# Patient Record
Sex: FEMALE | Race: WHITE | NOT HISPANIC OR LATINO | Employment: OTHER | URBAN - METROPOLITAN AREA
[De-identification: names, ages, dates, MRNs, and addresses within clinical notes are randomized per-mention and may not be internally consistent; named-entity substitution may affect disease eponyms.]

---

## 2023-10-26 ENCOUNTER — TELEMEDICINE (OUTPATIENT)
Dept: BEHAVIORAL HEALTH | Facility: CLINIC | Age: 58
End: 2023-10-26
Payer: MEDICARE

## 2023-10-26 DIAGNOSIS — F79 INTELLECTUAL DISABILITY: ICD-10-CM

## 2023-10-26 DIAGNOSIS — F41.1 GENERALIZED ANXIETY DISORDER: ICD-10-CM

## 2023-10-26 DIAGNOSIS — Z73.89 OTHER PROBLEMS RELATED TO LIFE MANAGEMENT DIFFICULTY: ICD-10-CM

## 2023-10-26 DIAGNOSIS — Z63.79 OTHER STRESSFUL LIFE EVENTS AFFECTING FAMILY AND HOUSEHOLD: ICD-10-CM

## 2023-10-26 DIAGNOSIS — Z86.59 HISTORY OF DEPRESSION: ICD-10-CM

## 2023-10-26 DIAGNOSIS — F60.9 PERSONALITY DISORDER, UNSPECIFIED (MULTI): ICD-10-CM

## 2023-10-26 PROCEDURE — 99214 OFFICE O/P EST MOD 30 MIN: CPT | Performed by: STUDENT IN AN ORGANIZED HEALTH CARE EDUCATION/TRAINING PROGRAM

## 2023-10-26 NOTE — PROGRESS NOTES
"PRESENT FOR VISIT  -Patient  -Caregiver    #LAST INTERVENTION   No medications changes. Given reports of sleep disturbance, patient was counselled on sleep hygiene and to track sleep. Discussed the need to set boundaries in the new relationship and counselled on safe sex.       HISTORY OF PRESENT ILLNESS   Interval change  Has not been meeting up with her boyfriend in the past 3 weeks. Reportedly had no argument with him. Staff noted that patient might have been \"ghosted\". No new issues/concerns.      #Mood/Irritability   Stable. Patient expresses feelings of sadness and anger since her boyfriend started ignoring him. However, staff reports that patient has been keeping busy with other activities like candle making. Patient will be going for [employer] dance on Friday and bowling on Tuesday.  No report of significant changes in mood, crying spells, frequent mood swings, or significant irritability.   No report of concern for depression from staff.  Does not endorse cardinal signs/symptoms of major depressive disorder.       #Behavior   Stable. No reports of impulsive shopping and is spending within her budget. No report of physical aggression, significant property destruction, elopement, or self-injurious behavior.   Recall from prior: Report of issues with anger or impulse control. The staff reports that she gets angry when they try to restrict her shopping. There is a report of obsession over people and manipulation.      #Sleep  Stable. No reports of sleep changes.  Recall from prior: Reports difficulty falling asleep about once a month.      #Anxiety   Stable  No report of excessive worrying, perseverating, or reassurance-seeking behavior.   No report of significant restlessness, pacing, or other signs suggesting psychomotor agitation.   No report of signs/symptoms consistent with separation anxiety or panic attacks.        #Medication   Stable  Endorses medication adherence  No report of medication side " effects  No significant change in frequency of use of PRNs for behaviors/agitation  No significant change in effectiveness of PRNs for mitigating target behavior        #Health   Stable  No report of hospitalizations or ED visits since last visit.  No report of significant change in health status, overall functioning, or non-psych medication  No report of significant change in appetite        REVIEW OF SYMPTOMS  -Depression/Mood: negative  -Anxiety: negative  -Psychosis: negative  -Valentina: negative  -ADHD: negative  -ODD: negative  -OCD: negative  -Sexually inappropriate behavior: none reported  -Sleep: No issues reported. No changes from baseline.    -Appetite: No issues reported. No report of pica. No changes from baseline  -Medical ROS: no report of difficulty urinating, seizures, rash, polydipsia, or constipation beyond baseline.  *All other systems have been reviewed and are negative for complaint unless otherwise noted above       PSYCHIATRIC/BEHAVIORAL HISTORY  -Prior diagnoses: depression, anxiety     -Was previously seeing psychiatrist at [facility] (since ~2014)  -Therapist: sees a counselor at [facility], Esther Chi  -Past psych hospitalizations: 2014 and 2023 for SI/HI but per staff report was thought to be misunderstanding  -No reported history of violence  -No reported history of self-injurious behavior  -No reported history of suicide attempt  -Previous psych meds: Escitalopram  -Current psych meds:   --Buspar 15 mg qam, 15mg at noon and 10 mg at 7 pm  --Lamotrigine 150 mg at 7 pm  --Abilify 2.5 mg once daily at 7 pm      SOCIAL HISTORY  -Lives alone in an apartment; does not like to live with others  -Family: minimal involvement (very few family members left)  -Interests/hobbies: shopping, eating out ([employer]), hanging out with friend Patricia  -Care needs: staff only in the daytime, 4 to 5 hours a day, iADLs, some assistance with household chores reportedly because patient not  wanting to rather than being unable to  -Work/School: attends day program/workshop  -Guardianship: guardian Gloria Alvarado   -Language/Communication: Good verbal fluency. Spontaneous speech. Good pragmatic language abilities.  -Cognitive abilities: Unknown history of formal testing. Able to read/write about middle school, fair money management abilities.    -Does not endorse ETOH or illicit drug use. No reported history of past substance abuse.  -Former smoker/occasional smoker  -No reported history of significant trauma   -No reported access to firearms         PAST MEDICAL HISTORY  -Hypertension  -Hypercholesterolemia  -Diabetes Mellitus  -Polyps and diverticulosis. Patient undergoes colonoscopy annually.   -PCP: Maxwell Patel  -Podiatrist: Pamela Aranda  -Optometrist: Rony Hidalgo  -Dentist: [facility]  -ObGyn: [facility]      ALLERGIES  -Penicillins  -Seasonal allergies        CURRENT MEDICATIONS  -Info caregiver and pharmacdy  -Current psych meds:   --Buspar 15 mg qam, 15mg at noon and 10 mg at 7 pm  --Lamotrigine 150 mg at 7 pm  --Abilify 2.5 mg once daily at 7 pm      PHARMACY  -[facility]      FAMILY HISTORY  -No reported family history of suicide  -No reported family history of intellectual disability or autism spectrum disorder  -No reported family history of early sudden cardiac arrest        Risk Assessment:  Patient felt to be at low acute and imminent risk of harm to self and others based on consideration of their risk and protective factors, as well as evaluation of their current clinical condition. Patient does not currently meet criteria for inpatient psychiatric hospitalization given that they do not exhibit evidence of clinically significant deterioration in baseline psychiatric illness, aggression, self-injury, or ability to care for themselves. There is no evidence that patient's current caregivers/living environment are unable to safely manage patient's behavior.  Outpatient management is currently felt to be appropriate and in the best interest of the patient. Overall risk mitigated by psychiatric follow-up care, use of psychotropic medication, guardianship, and [facility] Board services. Have engaged patient/caregivers in safety planning. They are aware of emergency psychiatric resources available in the event of an acute psychiatric emergency, such as Mobile Crisis, 911, and local ER.       Mental Status Exam:     Appearance: adequate grooming   Build: average  Demeanor: average   Eye Contact: average   Motor Activity: Average, no PMA/PMR.  Musculoskeletal: No TD, tics, or tremor appreciated. AIMS score 0.   Mood: euthymic   Affect: full  Speech: Regular rate, rhythm, volume and tone. Good verbal fluency. Spontaneous speech. Good pragmatic language abilities.  Thought Process: [place]. Generally goal directed. Associations are logical.  Thought Content: Does not endorse suicidal or homicidal ideation, no delusions elicited.   Thought Perception: Does not endorse auditory or visual hallucinations. Does not appear to be responding to hallucinatory stimuli  Orientation: alert, oriented x 3  Attention/Concentration: normal  Cognition: absence of significant cognitive impairment; but possible borderline intellectual functioning  Insight: limited  Judgement: adequate      IMPRESSION  Patient appears to be psychiatrically and behaviorally stable. Other than feeling hurt because of recent issues with her boyfriend, patient is reportedly doing well. No reports of impulsive shopping. Tolerating medication and endorsing adherence. No new issues/concerns reported. Will continue current treatment plan and make no medication changes today.       Diagnoses:  -Generalized anxiety disorder  -Major depressive disorder  -Mild intellectual disability  -Personality disorder, other specified        PLAN / MANAGEMENT / RECOMMENDATIONS               #Visit Complexity  -Visit of high complexity given  patient's intellectual/developmental disability and/or impaired communication abilities resulting in need for the presence of a third party to provide clinical information and history.    #Medication management   -Continue:   --Buspar 10 mg, 1.5 tablets twice daily, am and pm  --Lamotrigine 150 mg at bedtime  --Abilify 5 mg once daily      #Medication Considerations  -Medication consent/assent:   Risks, benefits, alternatives, off-label uses, black box warnings, and frequent/important side effects of medications have been discussed with patient/caregiver. To the extent possible, they have voiced understanding and agreement with recommended use of psychotropic medication.     -Medical necessity for continued treatment with psychotropic medication:      [] Symptom reduction        [] Improvement in functioning      [] Reduce risk of harm to self/others      [x] Maintenance therapy to prevent deterioration in functioning      -Rational for not reducing medication dose at this time:           [x] Significant risk of deterioration in functioning       [] Concern for elevating risk of harm to self/others      [] Prior dose reduction unsuccessful and/or harmful      [x] Psychiatric/behavioral condition not adequately stabilized/optimized       [] Medication regimen recently modified          -OARRS  --I have personally reviewed patient's OARRS report        -Risk/Benefit assessment:  There is no report of signs/symptoms consistent with medication-induced impairment in daily functioning. At this time, benefits of medication felt to outweigh potential risks. But we will continue to reassess need for psychotropic medication at regular 3 to 6 month intervals, or sooner as clinically indicated.      #Monitoring plan  -Labs reportedly done 8/22/23  --Labs to monitor: CBC, CMP, TSH/T4, lipid panel, Hgb A1c, EKG      #MDM/Complexity Issues    [] Chronic medical comorbidities     [] Chronic risk of harm to self/others     [x]  Intellectual/Developmental disability     [x] Need for independent historian due to intellectual/developmental disability              and/or   impaired communication abilities     [] Controlled substance medication requiring regular monitoring     [] Medication requiring significant ongoing monitoring for toxicity       #Counseling Provided     [x] Diagnostic impression/prognosis      [x] Risks and benefits of treatment options     [x] Instruction for management/treatment and follow-up     [x] Educated patient/caregiver about:  safety planning, communication                  #Coordination of care provided    [] Family    [x] Caregiver/DSP/Staff       []Agency supervisor       [] Nursing staff      [] SSA/          []Therapist                     []Guardian       #Follow-up   -in about 1 month, or sooner if new/worsening symptoms           Time:  Prep time on date of the patient encounter: 10 minutes  Time spent directly with patient/family/caregiver: 40 minutes  Additional time spent on patient care activities: 10 minutes   Documentation time: 10 minutes  Total time on date of patient encounter: 70 minutes        Scribe Attestation  By signing my name below, I, Barbiparadise Ricks , Scribbabita   attest that this documentation has been prepared under the direction and in the presence of Gloria Dexter DO.

## 2023-11-30 ENCOUNTER — TELEMEDICINE (OUTPATIENT)
Dept: BEHAVIORAL HEALTH | Facility: CLINIC | Age: 58
End: 2023-11-30
Payer: MEDICARE

## 2023-11-30 DIAGNOSIS — F60.9 PERSONALITY DISORDER, UNSPECIFIED (MULTI): ICD-10-CM

## 2023-11-30 DIAGNOSIS — F41.1 GENERALIZED ANXIETY DISORDER: ICD-10-CM

## 2023-11-30 DIAGNOSIS — F79 INTELLECTUAL DISABILITY: ICD-10-CM

## 2023-11-30 DIAGNOSIS — Z86.59 HISTORY OF DEPRESSION: ICD-10-CM

## 2023-11-30 DIAGNOSIS — F32.9 MAJOR DEPRESSIVE DISORDER, REMISSION STATUS UNSPECIFIED, UNSPECIFIED WHETHER RECURRENT: ICD-10-CM

## 2023-11-30 DIAGNOSIS — Z73.89 OTHER PROBLEMS RELATED TO LIFE MANAGEMENT DIFFICULTY: ICD-10-CM

## 2023-11-30 DIAGNOSIS — Z63.79 OTHER STRESSFUL LIFE EVENTS AFFECTING FAMILY AND HOUSEHOLD: ICD-10-CM

## 2023-11-30 PROCEDURE — 99215 OFFICE O/P EST HI 40 MIN: CPT | Performed by: STUDENT IN AN ORGANIZED HEALTH CARE EDUCATION/TRAINING PROGRAM

## 2023-11-30 RX ORDER — LAMOTRIGINE 150 MG/1
1 TABLET ORAL NIGHTLY
COMMUNITY
Start: 2018-02-09 | End: 2023-12-12 | Stop reason: SDUPTHER

## 2023-11-30 RX ORDER — ARIPIPRAZOLE 5 MG/1
5 TABLET ORAL
COMMUNITY
Start: 2023-02-18 | End: 2023-12-12 | Stop reason: SDUPTHER

## 2023-11-30 RX ORDER — BUSPIRONE HYDROCHLORIDE 10 MG/1
15 TABLET ORAL 2 TIMES DAILY
COMMUNITY
Start: 2022-02-10 | End: 2023-12-12 | Stop reason: SDUPTHER

## 2023-11-30 NOTE — PATIENT INSTRUCTIONS
AFTER VISIT SUMMARY      Date: 11/30/2023  Appointment with Psychiatrist - Dr. Dexter      Reason for Visit:  -Routine follow-up/Medication management      Discussed during Appointment:   -Interval changes since last visit  -Medication      Clinical Impression/Status Update:  -Patient appears to be psychiatrically and behaviorally stable  -No report of new issues/concerns    Reported clinical stability suggests current medication regimen providing adequate symptom control while also being well tolerated by patient. Thus will continue/refill current meds for now.     -Risk/Benefit assessment:   Medical necessity for continued treatment with psychotropic medication:   There is no report of signs/symptoms consistent with medication-induced impairment in daily functioning. At this time, benefits of medication felt to outweigh potential risks. But we will continue to reassess need for psychotropic medication at regular 3 to 6 month intervals, or sooner as clinically indicated.      #Clinic Policies/Procedures  -Refills  Prescriptions will be sent to your pharmacy with enough refills to last until your next appointment. For established patients, we typically provide 6 refills for regular meds and 3 refills for controlled substances (e.g., ADHD stimulant medication, benzodiazepines such as lorazepam). We will not provide additional refills beyond that without having an appointment. You can schedule an appointment by calling our office at [phone].     -Paperwork Requests (e.g., [employer] for guardianship)  We ask that you please schedule an appointment if needing paperwork filled out by the doctor (e.g., [employer], FMLA form).  Please provide as much information as possible about your request and email the doctor any forms needing to be filled out prior to the appointment.       ===========================  INSTRUCTIONS/RECOMMENDATIONS  ===========================    #Medication   -No medication changes made  today  --Continue taking your psych medications the same as usual    --Refills will be sent to your pharmacy      #Follow-up  -We would like to see you again in about 6 weeks  --> Please call the office ([phone]) to schedule an appointment at your earliest convenience.    *If having new/worsening symptoms, please call the clinic ([phone]) to discuss being seen sooner

## 2023-11-30 NOTE — PROGRESS NOTES
PRESENT FOR VISIT  -Patient  -Caregiver    #LAST INTERVENTION   Last seen about 1 month ago. No medications changes. Other than feeling hurt because of issues with her boyfriend, patient was reportedly doing well. No reports of impulsive shopping.      HISTORY OF PRESENT ILLNESS   Interval change  -Patient reported to be at about their psychiatric/behavioral baseline  -No report of new issues/concerns    -Patient has been in touch with her boyfriend lately over the phone. She talks and texts with him occasionally. Patient reports that she has been supporting him emotionally since the passing of his mom. Patient goes out into the community where she watches movies and "[employer]ke and acknowledges that she has been enjoying herself. Patient is traveling on December 8 for her vacation in [place]. She'll be spending about 3 days away.  -No report of impulsive shopping.       #Mood/Irritability   Stable.  No report of significant changes in mood, crying spells, frequent mood swings, or significant irritability.   No report of concern for depression from staff.  Does not endorse cardinal signs/symptoms of major depressive disorder.   Recall from prior: Feelings of sadness and anger since her boyfriend started ignoring her. However, staff reports that patient has been keeping busy with other activities like candle making.       #Behavior   Stable. No reports of impulsive shopping. Patient reports not knowing what her weekly budget is but has someone that she discusses it with.  No report of physical aggression, significant property destruction, elopement, or self-injurious behavior.   Recall from prior: Report of issues with anger or impulse control. The staff reports that she gets angry when they try to restrict her shopping. There is a report of obsession over people and manipulation.      #Sleep  Stable. No reports of sleep changes.  Recall from prior: Reports difficulty falling asleep about once a month.      #Anxiety    Stable  No report of excessive worrying, perseverating, or reassurance-seeking behavior.   No report of significant restlessness, pacing, or other signs suggesting psychomotor agitation.   No report of signs/symptoms consistent with separation anxiety or panic attacks.        #Medication   Stable  Endorses medication adherence  No report of medication side effects  No significant change in frequency of use of PRNs for behaviors/agitation  No significant change in effectiveness of PRNs for mitigating target behavior        #Health   Stable. Getting her Flu shot and COVID booster shot soon.  No report of hospitalizations or ED visits since last visit.  No report of significant change in health status, overall functioning, or non-psych medication  No report of significant change in appetite      REVIEW OF SYMPTOMS  -Depression/Mood: negative  -Anxiety: negative  -Psychosis: negative  -Valentina: negative  -ADHD: negative  -ODD: negative  -OCD: negative  -Sexually inappropriate behavior: none reported  -Sleep: No issues reported. No changes from baseline.    -Appetite: No issues reported. No report of pica. No changes from baseline  -Medical ROS: no report of difficulty urinating, seizures, rash, polydipsia, or constipation beyond baseline.  *All other systems have been reviewed and are negative for complaint unless otherwise noted above     PSYCHIATRIC/BEHAVIORAL HISTORY  -Prior diagnoses: depression, anxiety     -Was previously seeing psychiatrist at [facility] (since ~2014)  -Therapist: sees a counselor at [facility], Esther Chi  -Past psych hospitalizations: 2014 and 2023 for SI/HI but per staff report was thought to be misunderstanding  -No reported history of violence  -No reported history of self-injurious behavior  -No reported history of suicide attempt  -Previous psych meds: Escitalopram  -Current psych meds:   --Buspar 15 mg qam, 15mg at noon and 10 mg at 7 pm  --Lamotrigine 150 mg at 7  pm  --Abilify 2.5 mg once daily at 7 pm        SOCIAL HISTORY  -Lives alone in an apartment; does not like to live with others  -Family: minimal involvement (very few family members left)  -Interests/hobbies: shopping, eating out ([employer]), hanging out with friend Patricia  -Care needs: staff only in the daytime, 4 to 5 hours a day, iADLs, some assistance with household chores reportedly because patient not wanting to rather than being unable to  -Work/School: attends day program/workshop  -Guardianship: guardian Gloria Alvarado   -Language/Communication: Good verbal fluency. Spontaneous speech. Good pragmatic language abilities.  -Cognitive abilities: Unknown history of formal testing. Able to read/write about middle school, fair money management abilities.    -Does not endorse ETOH or illicit drug use. No reported history of past substance abuse.  -Former smoker/occasional smoker  -No reported history of significant trauma   -No reported access to firearms            PAST MEDICAL HISTORY  -Hypertension  -Hypercholesterolemia  -Diabetes Mellitus  -Polyps and diverticulosis. Patient undergoes colonoscopy annually.   -PCP: Maxwell Patel  -Podiatrist: Pamela Aranda  -Optometrist: Rony Hidalgo  -Dentist: [facility]  -ObGyn: [facility]        ALLERGIES  -Penicillins  -Seasonal allergies           CURRENT MEDICATIONS  -Info caregiver and pharmacdy  -Current psych meds:   --Buspar 15 mg qam, 15mg at noon and 10 mg at 7 pm  --Lamotrigine 150 mg at 7 pm  --Abilify 2.5 mg once daily at 7 pm        PHARMACY  -[facility]        FAMILY HISTORY  -No reported family history of suicide  -No reported family history of intellectual disability or autism spectrum disorder  -No reported family history of early sudden cardiac arrest           Risk Assessment:  Patient felt to be at low acute and imminent risk of harm to self and others based on consideration of their risk and protective factors, as well as  evaluation of their current clinical condition. Patient does not currently meet criteria for inpatient psychiatric hospitalization given that they do not exhibit evidence of clinically significant deterioration in baseline psychiatric illness, aggression, self-injury, or ability to care for themselves. There is no evidence that patient's current caregivers/living environment are unable to safely manage patient's behavior. Outpatient management is currently felt to be appropriate and in the best interest of the patient. Overall risk mitigated by psychiatric follow-up care, use of psychotropic medication, guardianship, and [facility] Board services. Have engaged patient/caregivers in safety planning. They are aware of emergency psychiatric resources available in the event of an acute psychiatric emergency, such as [email or web address], 911, and local ER.         Mental Status Exam:     Appearance: adequate grooming   Build: average  Demeanor: average   Eye Contact: average   Motor Activity: Average, no PMA/PMR.  Musculoskeletal: No TD, tics, or tremor appreciated. AIMS score 0.   Mood: euthymic   Affect: full  Speech: Regular rate, rhythm, volume and tone. Good verbal fluency. Spontaneous speech. Good pragmatic language abilities.  Thought Process: [place]. Generally goal directed. Associations are logical.  Thought Content: Does not endorse suicidal or homicidal ideation, no delusions elicited.   Thought Perception: Does not endorse auditory or visual hallucinations. Does not appear to be responding to hallucinatory stimuli  Orientation: alert, oriented x 3  Attention/Concentration: normal  Cognition: absence of significant cognitive impairment; but possible borderline intellectual functioning  Insight: limited  Judgement: adequate        IMPRESSION  Patient appears to be psychiatrically and behaviorally stable.  Tolerating medication and endorsing adherence. No new issues/concerns reported.     Given long-term stability and  staff report of unclear indication for being on some of her psychotropics, will start a slow de-prescribing process with close monitoring to optimize early detection of emerging psychiatric concerns. Will begin by discontinuing patient's mid-day dose of Buspar.     Have encouraged caregiver to register with [facility]. Will complete expert eval for continued guardianship and email that to Guardian likely in next couple weeks. Plan for follow up in 4 to 6 weeks        Diagnoses:  -Generalized anxiety disorder  -Major depressive disorder  -Mild intellectual disability  -Personality disorder, other specified        PLAN / MANAGEMENT / RECOMMENDATIONS               #Visit Complexity  -Visit of high complexity given patient's intellectual/developmental disability and/or impaired communication abilities resulting in need for the presence of a third party to provide clinical information and history.    #Medication management   -Discontinue Buspar 15mg at noon   -Continue:   --Buspar 15 mg qam and 10 mg at 7 pm  --Lamotrigine 150 mg once daily at 7 pm  --Abilify 2.5 mg once daily at 7 pm      #Medication Considerations  -Medication consent/assent:   Risks, benefits, alternatives, off-label uses, black box warnings, and frequent/important side effects of medications have been discussed with patient/caregiver. To the extent possible, they have voiced understanding and agreement with recommended use of psychotropic medication.     -Medical necessity for continued treatment with psychotropic medication:      [] Symptom reduction        [] Improvement in functioning      [] Reduce risk of harm to self/others      [x] Maintenance therapy to prevent deterioration in functioning      -Rational for not reducing medication dose at this time:           [x] Significant risk of deterioration in functioning       [] Concern for elevating risk of harm to self/others      [] Prior dose reduction unsuccessful and/or harmful      []  Psychiatric/behavioral condition not adequately stabilized/optimized       [] Medication regimen recently modified          -OARRS  --I have personally reviewed patient's OARRS report  --OARRS last checked 11/30/23      -Risk/Benefit assessment:  There is no report of signs/symptoms consistent with medication-induced impairment in daily functioning. At this time, benefits of medication felt to outweigh potential risks. But we will continue to reassess need for psychotropic medication at regular 3 to 6 month intervals, or sooner as clinically indicated.      #Monitoring plan  -Labs reportedly done 8/22/23  --Labs to monitor: CBC, CMP, TSH/T4, lipid panel, Hgb A1c, EKG      #MDM/Complexity Issues    [] Chronic medical comorbidities     [] Chronic risk of harm to self/others     [x] Intellectual/Developmental disability     [x] Need for independent historian due to intellectual/developmental disability            and/or  impaired communication abilities     [] Controlled substance medication requiring regular monitoring     [x] Medication requiring significant ongoing monitoring for toxicity (Abilify)      #Counseling Provided     [x] Diagnostic impression/prognosis      [x] Risks and benefits of treatment options     [x] Instruction for management/treatment and follow-up     [x] Educated patient/caregiver about: safety planning                  #Coordination of care provided    [] Family    [x] Caregiver/DSP/Staff       []Agency supervisor       [] Nursing staff      [] SSA/          []Therapist                     [x]Guardian      #Follow-up        -in about 4 to 6 weeks, or sooner if new/worsening symptoms          Time  -Prep time on date of the patient encounter: 10 minutes  -Time spent directly with patient/family/caregiver: 40 minutes  -Additional time spent on patient care activities: 10 minutes  -Documentation time: 10 minutes  -Total time on date of patient encounter: 70 minutes         Scribe  Attestation  By signing my name below, I, Shruthi Thompson   attest that this documentation has been prepared under the direction and in the presence of Gloria Dexter DO.

## 2023-12-01 ENCOUNTER — TELEPHONE (OUTPATIENT)
Dept: OTHER | Age: 58
End: 2023-12-01
Payer: MEDICARE

## 2023-12-04 PROBLEM — F60.9 PERSONALITY DISORDER, UNSPECIFIED (MULTI): Status: ACTIVE | Noted: 2023-12-04

## 2023-12-04 PROBLEM — F41.1 GENERALIZED ANXIETY DISORDER: Status: ACTIVE | Noted: 2023-12-04

## 2023-12-04 PROBLEM — F79 INTELLECTUAL DISABILITY: Status: ACTIVE | Noted: 2023-12-04

## 2023-12-04 PROBLEM — Z73.89 OTHER PROBLEMS RELATED TO LIFE MANAGEMENT DIFFICULTY: Status: ACTIVE | Noted: 2023-12-04

## 2023-12-04 PROBLEM — Z63.79 OTHER STRESSFUL LIFE EVENTS AFFECTING FAMILY AND HOUSEHOLD: Status: ACTIVE | Noted: 2023-12-04

## 2023-12-04 PROBLEM — Z86.59 HISTORY OF DEPRESSION: Status: ACTIVE | Noted: 2023-12-04

## 2023-12-07 ENCOUNTER — TELEPHONE (OUTPATIENT)
Dept: OTHER | Age: 58
End: 2023-12-07
Payer: MEDICARE

## 2023-12-12 PROBLEM — F32.9 MAJOR DEPRESSIVE DISORDER: Status: ACTIVE | Noted: 2023-12-12

## 2023-12-12 RX ORDER — ARIPIPRAZOLE 5 MG/1
TABLET ORAL
Qty: 16 TABLET | Refills: 0 | OUTPATIENT
Start: 2023-12-12

## 2023-12-12 RX ORDER — LAMOTRIGINE 150 MG/1
TABLET ORAL
Qty: 30 TABLET | Refills: 6 | Status: SHIPPED | OUTPATIENT
Start: 2023-12-12

## 2023-12-12 RX ORDER — LAMOTRIGINE 150 MG/1
TABLET ORAL
Qty: 22 TABLET | Refills: 0 | OUTPATIENT
Start: 2023-12-12

## 2023-12-12 RX ORDER — BUSPIRONE HYDROCHLORIDE 10 MG/1
TABLET ORAL
Qty: 124 TABLET | Refills: 0 | OUTPATIENT
Start: 2023-12-12

## 2023-12-12 RX ORDER — CHOLECALCIFEROL (VITAMIN D3) 25 MCG
TABLET ORAL DAILY
Qty: 21 TABLET | Refills: 0 | OUTPATIENT
Start: 2023-12-12

## 2023-12-12 RX ORDER — ARIPIPRAZOLE 5 MG/1
TABLET ORAL
Qty: 15 TABLET | Refills: 6 | Status: SHIPPED | OUTPATIENT
Start: 2023-12-12 | End: 2024-02-22 | Stop reason: SDUPTHER

## 2023-12-12 RX ORDER — BUSPIRONE HYDROCHLORIDE 10 MG/1
TABLET ORAL
Qty: 75 TABLET | Refills: 6 | Status: SHIPPED | OUTPATIENT
Start: 2023-12-12

## 2024-01-11 ENCOUNTER — TELEMEDICINE (OUTPATIENT)
Dept: BEHAVIORAL HEALTH | Facility: CLINIC | Age: 59
End: 2024-01-11
Payer: MEDICARE

## 2024-01-11 DIAGNOSIS — F41.1 GENERALIZED ANXIETY DISORDER: ICD-10-CM

## 2024-01-11 DIAGNOSIS — Z86.59 HISTORY OF DEPRESSION: ICD-10-CM

## 2024-01-11 DIAGNOSIS — F60.9 PERSONALITY DISORDER, UNSPECIFIED (MULTI): ICD-10-CM

## 2024-01-11 DIAGNOSIS — F79 INTELLECTUAL DISABILITY: ICD-10-CM

## 2024-01-11 PROCEDURE — 99214 OFFICE O/P EST MOD 30 MIN: CPT | Performed by: STUDENT IN AN ORGANIZED HEALTH CARE EDUCATION/TRAINING PROGRAM

## 2024-01-11 PROCEDURE — 90833 PSYTX W PT W E/M 30 MIN: CPT | Performed by: STUDENT IN AN ORGANIZED HEALTH CARE EDUCATION/TRAINING PROGRAM

## 2024-01-11 PROCEDURE — 90785 PSYTX COMPLEX INTERACTIVE: CPT | Performed by: STUDENT IN AN ORGANIZED HEALTH CARE EDUCATION/TRAINING PROGRAM

## 2024-01-11 NOTE — PATIENT INSTRUCTIONS
AFTER VISIT SUMMARY      Date: 1/11/2024  Appointment with Psychiatrist - Dr. Dexter      Reason for Visit:  -Routine follow-up/Medication management      Discussed during Appointment:   -Recent holidays, relationship, candle making, travel plans  -Physical health, psychiatric/behavioral symptoms, daily functioning, new issues/concerns     -Treatment plan/management      Clinical Impression/Status Update:  Patient reports increase in anxiety. Maybe due to stress from recent traveling or issues with boyfriend. Could also be from the recent decrease in Buspar. Will make no medication changes today. Will continue to monitor symptoms and reassess in 4 to 6 weeks.        -Risk/Benefit assessment:   Medical necessity for continued treatment with psychotropic medication:   There is no report of signs/symptoms consistent with medication-induced impairment in daily functioning. At this time, benefits of medication felt to outweigh potential risks. But we will continue to reassess need for psychotropic medication at regular 3 to 6 month intervals, or sooner as clinically indicated.      #Clinic Policies/Procedures  -Refills  Prescriptions will be sent to your pharmacy with enough refills to last until your next appointment. For established patients, we typically provide 6 refills for regular meds and 3 refills for controlled substances (e.g., ADHD stimulant medication, benzodiazepines such as lorazepam). We will not provide additional refills beyond that without having an appointment. You can schedule an appointment by calling our office at [phone].     -Paperwork Requests (e.g., Expert Eval for guardianship)  We ask that you please schedule an appointment if needing paperwork filled out by the doctor (e.g., Expert Eval, FMLA form).  Please provide as much information as possible about your request and email the doctor any forms needing to be filled out prior to the appointment.        ===========================  INSTRUCTIONS/RECOMMENDATIONS  ===========================    #Medication   -No medication changes made today  --Continue taking your psych medications the same as usual    --Refills will be sent to your pharmacy        #Technical Issues with Epic -> Please help us improve the Epic experience  To help identify issues needing to be fixed and improve patient care, please report any issues you experience with Epic or [employer], such as difficulty connecting to video during virtual visits.  [phone]        #Follow-up  --Your next appointment is scheduled for 2/22/2024 at 2:00pm (virtual visit with [employer])    *If having new/worsening symptoms, please call the clinic ([phone]) to discuss being seen sooner

## 2024-01-11 NOTE — PROGRESS NOTES
"PRESENT FOR VISIT  -Patient  -Caregiver      #LAST INTERVENTION   Last seen about 6 weeks ago. Discussions were had about slowly de-prescribing medications. Patient's midday dose of Buspar was discontinued.   Patient was still in touch with her boyfriend. She was supposed to take a vacation to [place]. No report of impulsive shopping.      HISTORY OF PRESENT ILLNESS   Interval change  -Patient reported to be at about their psychiatric/behavioral baseline  -No report of new issues/concerns    -Patient reports that she had a good time in [place].  -Coped well with the holidays.  -Travelling to [place] at the end of May.  -Staff wants to know if we accept medicare for counseling      #Mood/Irritability   Stable.   No report of significant changes in mood, crying spells, frequent mood swings, or significant irritability.   No report of concern for depression from staff.  Does not endorse cardinal signs/symptoms of major depressive disorder.   Recall from prior: Feelings of sadness and anger since her boyfriend started ignoring her. However, staff reports that patient has been keeping busy with other activities like candle making.       #Behavior   Stable. Patient reports that she's trying to spend money within her budget.   No report of physical aggression, significant property destruction, elopement, or self-injurious behavior.   Recall from prior: Report of issues with anger or impulse control. The staff reports that she gets angry when they try to restrict her shopping. There is a report of obsession over people and manipulation.      #Sleep  Stable. No reports of sleep changes.  Recall from prior: Reports difficulty falling asleep about once a month.      #Anxiety   Worse. Patient notes that she is a little bit stressed because of her boyfriend. She reports feeling more stressed than she was at the last visit. Patient notes that she is \"mad\" most of the time and this feeling is towards her boyfriend.  No report of " excessive worrying, perseverating, or reassurance-seeking behavior.   No report of significant restlessness, pacing, or other signs suggesting psychomotor agitation.   No report of signs/symptoms consistent with separation anxiety or panic attacks.      #Medication   Patient notes that she doesn't require medication for her irritability.  Endorses medication adherence  No report of medication side effects      #Health   Patient reports that blood glucose has been stable.  Stable.   No report of hospitalizations or ED visits since last visit.  No report of significant change in health status, overall functioning, or non-psych medication  No report of significant change in appetite      REVIEW OF SYMPTOMS  -Depression/Mood: negative  -Anxiety: negative  -Psychosis: negative  -Valentina: negative  -ADHD: negative  -ODD: negative  -OCD: negative  -Sexually inappropriate behavior: none reported  -Sleep: No issues reported. No changes from baseline.    -Appetite: No issues reported. No report of pica. No changes from baseline  -Medical ROS: no report of difficulty urinating, seizures, rash, polydipsia, or constipation beyond baseline.  *All other systems have been reviewed and are negative for complaint unless otherwise noted above       PSYCHIATRIC/BEHAVIORAL HISTORY  -Prior diagnoses: depression, anxiety     -Was previously seeing psychiatrist at [facility] (since ~2014)  -Therapist: sees a counselor at [facility] [place], [place]  -Past psych hospitalizations: 2014 and 2023 for SI/HI but per staff report was thought to be misunderstanding  -No reported history of violence  -No reported history of self-injurious behavior  -No reported history of suicide attempt  -Previous psych meds: Escitalopram  -Current psych meds:   --Buspar 15 mg qam, 15mg at noon and 10 mg at 7 pm  --Lamotrigine 150 mg at 7 pm  --Abilify 2.5 mg once daily at 7 pm        SOCIAL HISTORY  -Lives alone in an apartment; does not like to live with  others  -Family: minimal involvement (very few family members left)  -Interests/hobbies: shopping, eating out ([employer]), hanging out with friend Patricia  -Care needs: staff only in the daytime, 4 to 5 hours a day, iADLs, some assistance with household chores reportedly because patient not wanting to rather than being unable to  -Work/School: attends day program/workshop  -Guardianship: guardian Gloria Alvarado   -Language/Communication: Good verbal fluency. Spontaneous speech. Good pragmatic language abilities.  -Cognitive abilities: Unknown history of formal testing. Able to read/write about middle school, fair money management abilities.    -Does not endorse ETOH or illicit drug use. No reported history of past substance abuse.  -Former smoker/occasional smoker  -No reported history of significant trauma   -No reported access to firearms            PAST MEDICAL HISTORY  -Hypertension  -Hypercholesterolemia  -Diabetes Mellitus  -Polyps and diverticulosis. Patient undergoes colonoscopy annually.   -PCP: Maxwell Paetl  -Podiatrist: Pamela Aranda  -Optometrist: Rony Hidalgo  -Dentist: [facility]  -ObGyn: [facility]        ALLERGIES  -Penicillins  -Seasonal allergies           CURRENT MEDICATIONS  -Info caregiver and pharmacy  -Current psych meds:   --Buspar 15 mg qam, 15mg at noon and 10 mg at 7 pm  --Lamotrigine 150 mg at 7 pm  --Abilify 2.5 mg once daily at 7 pm        PHARMACY  -[facility]        FAMILY HISTORY  -No reported family history of suicide  -No reported family history of intellectual disability or autism spectrum disorder  -No reported family history of early sudden cardiac arrest        Mental Status Exam:     Appearance: adequate grooming   Build: average  Demeanor: average   Eye Contact: average   Motor Activity: Average, no PMA/PMR.  Musculoskeletal: No TD, tics, or tremor appreciated. AIMS score 0.   Mood: euthymic   Affect: full  Speech: Regular rate, rhythm, volume  and tone. Good verbal fluency. Spontaneous speech. Good pragmatic language abilities.  Thought Process: [place]. Generally goal directed. Associations are logical.  Thought Content: Does not endorse suicidal or homicidal ideation, no delusions elicited.   Thought Perception: Does not endorse auditory or visual hallucinations. Does not appear to be responding to hallucinatory stimuli  Orientation: alert, oriented x 3  Attention/Concentration: normal  Cognition: absence of significant cognitive impairment; but possible borderline intellectual functioning  Insight: limited  Judgement: adequate      #Psychotherapy provided   -Provided 20 minutes of psychotherapy to patient and/or family/caregivers    -Psychotherapeutic interventions utilized:   --Supportive, Solutions-focused, Parent/Caregiver Training  -Target issues/Main topics discussed:  --Psychiatric symptoms, behavior, daily life, stressors, living situation, family/friends, hobbies/interests, interpersonal conflicts  -Additional therapeutic interventions:   --Non-psychopharmacological Tx strategies were recommended. Family/caregivers provided with education using examples to illustrate and implementation advice offered.   -Response to therapy:  --Actively participated and responded favorably to the above psychotherapeutic interventions       Risk Assessment:  Patient felt to be at low acute and imminent risk of harm to self and others based on consideration of their risk and protective factors, as well as evaluation of their current clinical condition. Patient does not currently meet criteria for inpatient psychiatric hospitalization given that they do not exhibit evidence of clinically significant deterioration in baseline psychiatric illness, aggression, self-injury, or ability to care for themselves. There is no evidence that patient's current caregivers/living environment are unable to safely manage patient's behavior. Outpatient management is currently felt  to be appropriate and in the best interest of the patient. Overall risk mitigated by psychiatric follow-up care, use of psychotropic medication, guardianship, and [place] services. Have engaged patient/caregivers in safety planning. They are aware of emergency psychiatric resources available in the event of an acute psychiatric emergency, such as Mobile Crisis, 911, and local ER.            IMPRESSION  Female with mild intellectual disability and a history of generalized anxiety disorder who initially presented for  new patient evaluation to establish care for management of psychotropic medication.       As of this appointment:  Patient reports increase in anxiety. Maybe due to stress from recent traveling or issues with boyfriend. Could also be from the recent decrease in Buspar. Will make no medication changes today. Will continue to monitor symptoms and reassess in 4 to 6 weeks.      Diagnoses:  -Generalized anxiety disorder  -Major depressive disorder  -Mild intellectual disability  -Personality disorder, other specified        PLAN / MANAGEMENT / RECOMMENDATIONS    #Visit Complexity  -Visit of high complexity given patient's intellectual/developmental disability and/or impaired communication abilities resulting in need for the presence of a third party to provide clinical information and history.    #Medication management   -Continue:   --Buspar 15 mg qam and 10 mg at 7 pm  --Lamotrigine 150 mg once daily at 7 pm  --Abilify 2.5 mg once daily at 7 pm      #Medication Considerations  -Medication consent/assent:   Risks, benefits, alternatives, off-label uses, black box warnings, and frequent/important side effects of medications have been discussed with patient/caregiver. To the extent possible, they have voiced understanding and agreement with recommended use of psychotropic medication.     -Medical necessity for continued treatment with psychotropic medication:      [] Symptom reduction        []  Improvement in functioning      [] Reduce risk of harm to self/others      [x] Maintenance therapy to prevent deterioration in functioning      -Rational for not reducing medication dose at this time:           [x] Significant risk of deterioration in functioning       [] Concern for elevating risk of harm to self/others      [] Prior dose reduction unsuccessful and/or harmful      [] Psychiatric/behavioral condition not adequately stabilized/optimized       [] Medication regimen recently modified          -OARRS  --I have personally reviewed patient's OARRS report  --OARRS last checked 11/30/23      -Risk/Benefit assessment:  There is no report of signs/symptoms consistent with medication-induced impairment in daily functioning. At this time, benefits of medication felt to outweigh potential risks. But we will continue to reassess need for psychotropic medication at regular 3 to 6 month intervals, or sooner as clinically indicated.      #Medication Monitoring Plan  -Labs reportedly done 8/22/23  --Labs to monitor: CBC, CMP, TSH/T4, lipid panel, Hgb A1c, EKG        #Psychotherapy with E/M services provided as documented above      #MDM/Complexity Issues    [] Chronic medical comorbidities     [] Chronic risk of harm to self/others     [x] Intellectual/Developmental disability     [x] Need for independent historian due to intellectual/developmental disability            and/or  impaired communication abilities     [] Controlled substance medication requiring regular monitoring     [x] Medication requiring significant ongoing monitoring for toxicity (Abilify)      #Counseling Provided     [x] Diagnostic impression/prognosis      [x] Risks and benefits of treatment options     [x] Instruction for management/treatment and follow-up     [x] Educated patient/caregiver about: safety planning                  #Coordination of care provided    [] Family    [x] Caregiver/DSP/Staff       []Agency supervisor       [] Nursing  staff      [] SSA/          []Therapist                     [x]Guardian         #Follow-up      -in about 4 to 6 weeks, or sooner if new/worsening symptoms         >> Scheduled virtual Follow-up Appt on 2/22/2024 at 14:00        Time  -Prep time on date of the patient encounter: 10 minutes  -Time spent directly with patient/family/caregiver: 40 minutes  -Additional time spent on patient care activities: 10 minutes  -Documentation time: 10 minutes  -Total time on date of patient encounter: 70 minutes       Scribe Attestation  By signing my name below, I, Barbi Ricks , Scribe   attest that this documentation has been prepared under the direction and in the presence of Gloria Dexter DO.

## 2024-02-22 ENCOUNTER — OFFICE VISIT (OUTPATIENT)
Dept: BEHAVIORAL HEALTH | Facility: CLINIC | Age: 59
End: 2024-02-22
Payer: MEDICARE

## 2024-02-22 DIAGNOSIS — Z73.89 OTHER PROBLEMS RELATED TO LIFE MANAGEMENT DIFFICULTY: ICD-10-CM

## 2024-02-22 DIAGNOSIS — F60.9 PERSONALITY DISORDER, UNSPECIFIED (MULTI): ICD-10-CM

## 2024-02-22 DIAGNOSIS — F41.1 GENERALIZED ANXIETY DISORDER: ICD-10-CM

## 2024-02-22 DIAGNOSIS — Z86.59 HISTORY OF DEPRESSION: ICD-10-CM

## 2024-02-22 DIAGNOSIS — Z09 PSYCHIATRIC FOLLOW-UP: ICD-10-CM

## 2024-02-22 DIAGNOSIS — Z86.59 PSYCHIATRIC FOLLOW-UP: ICD-10-CM

## 2024-02-22 DIAGNOSIS — F32.9 MAJOR DEPRESSIVE DISORDER, REMISSION STATUS UNSPECIFIED, UNSPECIFIED WHETHER RECURRENT: ICD-10-CM

## 2024-02-22 DIAGNOSIS — F79 INTELLECTUAL DISABILITY: ICD-10-CM

## 2024-02-22 PROCEDURE — 1036F TOBACCO NON-USER: CPT | Performed by: STUDENT IN AN ORGANIZED HEALTH CARE EDUCATION/TRAINING PROGRAM

## 2024-02-22 PROCEDURE — 99214 OFFICE O/P EST MOD 30 MIN: CPT | Performed by: STUDENT IN AN ORGANIZED HEALTH CARE EDUCATION/TRAINING PROGRAM

## 2024-02-22 PROCEDURE — 90833 PSYTX W PT W E/M 30 MIN: CPT | Performed by: STUDENT IN AN ORGANIZED HEALTH CARE EDUCATION/TRAINING PROGRAM

## 2024-02-22 NOTE — PATIENT INSTRUCTIONS
AFTER VISIT SUMMARY      Date: 2/22/2024  Appointment with Psychiatrist - Dr. Dexter      Reason for Visit:  -Routine follow-up/Medication management      Discussed during Appointment:   -Trip to [place], work, shopping  -Physical health, psychiatric/behavioral symptoms, daily functioning, new issues/concerns     -Treatment plan/management  -Medication      Clinical Impression/Status Update:  Remains stable. No new stressors. Discussing with patient in order to minimize side effects because of co-morbidity with diabetes. Will de-prescribe as tolerated. Will reduce Abilify first given it has the most potential side effects. Will decrease Abilify from 2.5 mg daily to 2 mg daily. Patient is starting a new mail order of pills in 2 weeks so will follow up 4 weeks after medication change implementation      -Risk/Benefit assessment:   Medical necessity for continued treatment with psychotropic medication:   There is no report of signs/symptoms consistent with medication-induced impairment in daily functioning. At this time, benefits of medication felt to outweigh potential risks. But we will continue to reassess need for psychotropic medication at regular 3 to 6 month intervals, or sooner as clinically indicated.      #Clinic Policies/Procedures  -Refills  Prescriptions will be sent to your pharmacy with enough refills to last until your next appointment. For established patients, we typically provide 6 refills for regular meds and 3 refills for controlled substances (e.g., ADHD stimulant medication, benzodiazepines such as lorazepam). We will not provide additional refills beyond that without having an appointment. You can schedule an appointment by calling our office at [phone].     -Paperwork Requests (e.g., Expert Eval for guardianship)  We ask that you please schedule an appointment if needing paperwork filled out by the doctor (e.g., Expert Eval, FMLA form).  Please provide as much information as possible  about your request and email the doctor any forms needing to be filled out prior to the appointment.       ===========================  INSTRUCTIONS/RECOMMENDATIONS  ===========================    #Medication   -Medication changes made today:   --We are decreasing your Abilify from 2.5 mg daily to 2 mg daily       >>For prior authorization issues at the pharmacy -> Call the office and ask to talk to Nurse Gastelum.      #Technical Issues with Epic -> Please help us improve the Epic experience  To help identify issues needing to be fixed and improve patient care, please report any issues you experience with Epic or  [employer], such as difficulty connecting to video during virtual visits.  [phone]      #Follow-up  --Your next appointment is scheduled for 4/4/2024 at 3:00pm (virtual visit with [email or web address])    *If having new/worsening symptoms, please call the clinic ([phone]) to discuss being seen sooner   >>If unable to reach the office, send me an email at [email or web address]

## 2024-02-22 NOTE — PROGRESS NOTES
Others Attending Appointment:  -Staff    *Presence necessary as independent historian due to intellectual/developmental disability and/or impaired communication abilities      #LAST INTERVENTION   Last seen about 6 weeks ago. Reports of increased anxiety. No medication changes made. Staff wanted to know if medicare covers counseling.      HISTORY OF PRESENT ILLNESS   Interval change  -Patient reported to be at about their psychiatric/behavioral baseline  -No report of new issues/concerns    -Patient reportedly getting along with her boyfriend.  -Going to [place], [place] end of May with someone from the company  -Staff wants to know about scheduling for counseling. Preference for a blended format on late afternoon on Thursdays    #Mood/Irritability   Stable.   No report of significant changes in mood, crying spells, frequent mood swings, or significant irritability.   No report of concern for depression from staff.  Does not endorse cardinal signs/symptoms of major depressive disorder.   Recall from prior: Feelings of sadness and anger since her boyfriend started ignoring her. However, staff reports that patient has been keeping busy with other activities like candle making.       #Behavior   Stable. As prior, patient reports that she's trying to spend money within her budget.   No report of physical aggression, significant property destruction, elopement, or self-injurious behavior.   Recall from prior: Report of issues with anger or impulse control. The staff reports that she gets angry when they try to restrict her shopping. There is a report of obsession over people and manipulation.      #Sleep  Stable. No reports of sleep changes.  Recall from prior: Reports difficulty falling asleep about once a month.      #Anxiety   Stable.  No report of excessive worrying, perseverating, or reassurance-seeking behavior.   No report of significant restlessness, pacing, or other signs suggesting psychomotor agitation.  "  No report of signs/symptoms consistent with separation anxiety or panic attacks.  Recall from prior: Patient notes that she is a little bit stressed because of her boyfriend. Patient notes that she is \"mad\" most of the time and this feeling is towards her boyfriend.    #Medication   Patient noted preference for going down on some of her medications  -Endorses medication adherence.  -No report of concerns for medication side effects.   -No report of significant issues with constipation (beyond baseline chronic constipation), excessive sedation, drooling, dizziness, tremors, rigidity, or shuffling gait.  Recall from prior: Patient notes that she doesn't require medication for her irritability.    #Health   Stable. Recently had blood work done on Tuesday morning. Had allergy testing - allergic to dust and mold. Has scheduled weekly allergy shots.  No report of hospitalizations or ED visits since last visit.  No report of significant change in health status, overall functioning, or non-psych medication  No report of significant change in appetite.   -Patient maintains regular follow up appointments with other multiple providers for her complex medical co-morbidities. No report of problem-based visits outside of regularly scheduled maintenance.      REVIEW OF SYMPTOMS  -Depression/Mood: negative  -Anxiety: negative  -Psychosis: negative  -Valentina: negative  -ADHD: negative  -ODD: negative  -OCD: negative  -Sexually inappropriate behavior: none reported  -Sleep: No issues reported. No changes from baseline.    -Appetite: No issues reported. No report of pica. No changes from baseline  -Medical ROS: no report of difficulty urinating, seizures, rash, polydipsia, or constipation beyond baseline.  *All other systems have been reviewed and are negative for complaint unless otherwise noted above       PSYCHIATRIC/BEHAVIORAL HISTORY  -Prior diagnoses: depression, anxiety     -Was previously seeing psychiatrist at [facility] (since ~2014)  -Therapist: sees a counselor at [facility], [facility]  -Past psych hospitalizations: 2014 and 2023 for SI/HI but per staff report was thought to be misunderstanding  -No reported history of violence  -No reported history of self-injurious behavior  -No reported history of suicide attempt  -Previous psych meds: Escitalopram  -Current psych meds:   --Buspar 15 mg qam, 15mg at noon and 10 mg at 7 pm  --Lamotrigine 150 mg at 7 pm  --Abilify 2.5 mg once daily at 7 pm        SOCIAL HISTORY  -Lives alone in an apartment; does not like to live with others  -Family: minimal involvement (very few family members left)  -Interests/hobbies: shopping, eating out ([employer]), hanging out with friend Patricia  -Care needs: staff only in the daytime, 4 to 5 hours a day, iADLs, some assistance with household chores reportedly because patient not wanting to rather than being unable to  -Work/School: attends day program/workshop  -Guardianship: guardian Gloria Alvarado   -Language/Communication: Good verbal fluency. Spontaneous speech. Good pragmatic language abilities.  -Cognitive abilities: Unknown history of formal testing. Able to read/write about middle school, fair money management abilities.    -Does not endorse ETOH or illicit drug use. No reported history of past substance abuse.  -Former smoker/occasional smoker  -No reported history of significant trauma   -No reported access to firearms            PAST MEDICAL HISTORY  -Hypertension  -Hypercholesterolemia  -Diabetes Mellitus  -Polyps and diverticulosis. Patient undergoes colonoscopy annually.   -PCP: Maxwell Patel  -Podiatrist: Pamela Aranda  -Optometrist: Rony Hidalgo  -Dentist: [facility]  -ObGyn: [facility]        ALLERGIES  -Penicillins  -Seasonal allergies           CURRENT MEDICATIONS  -Info caregiver and pharmacy  -Current psych meds:   --Buspar 15 mg qam, 15mg at noon and 10 mg at 7 pm  --Lamotrigine 150 mg at 7  pm  --Abilify 2.5 mg once daily at 7 pm        PHARMACY  -[facility]        FAMILY HISTORY  -No reported family history of suicide  -No reported family history of intellectual disability or autism spectrum disorder  -No reported family history of early sudden cardiac arrest        Mental Status Exam:     Appearance: adequate grooming   Build: average  Demeanor: average   Eye Contact: average   Motor Activity: Average, no PMA/PMR.  Musculoskeletal: No TD, tics, or tremor appreciated. AIMS score 0.   Mood: euthymic   Affect: full  Speech: Regular rate, rhythm, volume and tone. Good verbal fluency. Spontaneous speech. Good pragmatic language abilities.  Thought Process: [place]. Generally goal directed. Associations are logical.  Thought Content: Does not endorse suicidal or homicidal ideation, no delusions elicited.   Thought Perception: Does not endorse auditory or visual hallucinations. Does not appear to be responding to hallucinatory stimuli  Orientation: alert, oriented x 3  Attention/Concentration: normal  Cognition: absence of significant cognitive impairment; but possible borderline intellectual functioning  Insight: limited  Judgement: adequate      #Psychotherapy provided   -Provided 20 minutes of psychotherapy to patient and/or family/caregivers    -Psychotherapeutic interventions utilized:   --Supportive, Solutions-focused, Parent/Caregiver Training  -Target issues/Main topics discussed:  --Psychiatric symptoms, behavior, daily life, stressors, living situation, family/friends, hobbies/interests, interpersonal conflicts, trip to [place]  -Additional therapeutic interventions:   --Non-psychopharmacological Tx strategies were recommended. Family/caregivers provided with education using examples to illustrate and implementation advice offered.   -Response to therapy:  --Actively participated and responded favorably to the above psychotherapeutic interventions       Risk Assessment:  Patient felt to  be at low acute and imminent risk of harm to self and others based on consideration of their risk and protective factors, as well as evaluation of their current clinical condition. Patient does not currently meet criteria for inpatient psychiatric hospitalization given that they do not exhibit evidence of clinically significant deterioration in baseline psychiatric illness, aggression, self-injury, or ability to care for themselves. There is no evidence that patient's current caregivers/living environment are unable to safely manage patient's behavior. Outpatient management is currently felt to be appropriate and in the best interest of the patient. Overall risk mitigated by psychiatric follow-up care, use of psychotropic medication, guardianship, and [place] services. Have engaged patient/caregivers in safety planning. They are aware of emergency psychiatric resources available in the event of an acute psychiatric emergency, such as Mobile Crisis, 911, and local ER.            IMPRESSION  Female with mild intellectual disability and a history of generalized anxiety disorder who initially presented for  new patient evaluation to establish care for management of psychotropic medication.       As of this appointment:  Remains stable. No new stressors. Discussing with patient in order to minimize side effects because of co-morbidity with diabetes. Will de-prescribe as tolerated. Will reduce Abilify first given it has the most potential side effects. Will decrease Abilify from 2.5 mg daily to 2 mg daily. Patient is starting a new mail order of pills in 2 weeks so will follow up 4 weeks after medication change implementation    Diagnoses:  -Generalized anxiety disorder  -Major depressive disorder  -Mild intellectual disability  -Personality disorder, other specified        PLAN / MANAGEMENT / RECOMMENDATIONS                      >>MED CHANGES<<    #Visit Complexity  -Visit of high complexity given patient's  intellectual/developmental disability and/or impaired communication abilities resulting in need for the presence of a third party to provide clinical information and history.    #Medication management   **Decrease:  --Abilify from 2.5 mg once daily to 2 mg daily  -Continue:   --Buspar 15 mg qam and 10 mg at 7 pm  --Lamotrigine 150 mg once daily at 7 pm        #Medication Considerations  -Medication consent/assent:   Risks, benefits, alternatives, off-label uses, black box warnings, and frequent/important side effects of medications have been discussed with patient/caregiver. To the extent possible, they have voiced understanding and agreement with recommended use of psychotropic medication.     -Medical necessity for continued treatment with psychotropic medication:      [] Symptom reduction        [] Improvement in functioning      [] Reduce risk of harm to self/others      [x] Maintenance therapy to prevent deterioration in functioning      -Rational for not reducing medication dose at this time:           [x] Significant risk of deterioration in functioning       [] Concern for elevating risk of harm to self/others      [] Prior dose reduction unsuccessful and/or harmful      [] Psychiatric/behavioral condition not adequately stabilized/optimized       [] Medication regimen recently modified          -OARRS  --I have personally reviewed patient's OARRS report        -Risk/Benefit assessment:  There is no report of signs/symptoms consistent with medication-induced impairment in daily functioning. At this time, benefits of medication felt to outweigh potential risks. But we will continue to reassess need for psychotropic medication at regular 3 to 6 month intervals, or sooner as clinically indicated.      #Medication Monitoring Plan  -Labs reportedly done 8/22/23  --Labs to monitor: CBC, CMP, TSH/T4, lipid panel, Hgb A1c, EKG        #Psychotherapy with E/M services provided as documented above      #MDM/Complexity  Issues    [x] Chronic medical comorbidities     [] Chronic risk of harm to self/others     [x] Intellectual/Developmental disability     [x] Need for independent historian due to intellectual/developmental disability            and/or  impaired communication abilities     [] Controlled substance medication requiring regular monitoring     [x] Medication requiring significant ongoing monitoring for toxicity (Abilify)      #Counseling Provided     [x] Diagnostic impression/prognosis      [x] Risks and benefits of treatment options     [x] Instruction for management/treatment and follow-up     [x] Educated patient/caregiver about: safety planning                  #Coordination of care provided    [] Family    [x] Caregiver/DSP/Staff       []Agency supervisor       [] Nursing staff      [] SSA/          []Therapist                     []Guardian         #Follow-up      -in about 6 weeks, or sooner if new/worsening symptoms         >> Scheduled virtual Follow-up Appt on 4/4/2024 at 15:00        Time  -Prep time on date of the patient encounter: 10 minutes  -Time spent directly with patient/family/caregiver: 40 minutes  -Additional time spent on patient care activities: 10 minutes  -Documentation time: 10 minutes  -Total time on date of patient encounter: 70 minutes       Scribe Attestation  By signing my name below, I, Barbi Millicent , Scribe   attest that this documentation has been prepared under the direction and in the presence of Gloria Dexter DO.

## 2024-02-23 RX ORDER — ARIPIPRAZOLE 2 MG/1
TABLET ORAL
Qty: 30 TABLET | Refills: 6 | Status: SHIPPED | OUTPATIENT
Start: 2024-02-23 | End: 2024-04-25 | Stop reason: SDUPTHER

## 2024-04-25 ENCOUNTER — TELEMEDICINE (OUTPATIENT)
Dept: BEHAVIORAL HEALTH | Facility: CLINIC | Age: 59
End: 2024-04-25
Payer: MEDICARE

## 2024-04-25 DIAGNOSIS — F32.9 MAJOR DEPRESSIVE DISORDER, REMISSION STATUS UNSPECIFIED, UNSPECIFIED WHETHER RECURRENT: ICD-10-CM

## 2024-04-25 DIAGNOSIS — Z86.59 PSYCHIATRIC FOLLOW-UP: ICD-10-CM

## 2024-04-25 DIAGNOSIS — F60.9 PERSONALITY DISORDER, UNSPECIFIED (MULTI): ICD-10-CM

## 2024-04-25 DIAGNOSIS — Z73.89 OTHER PROBLEMS RELATED TO LIFE MANAGEMENT DIFFICULTY: ICD-10-CM

## 2024-04-25 DIAGNOSIS — Z09 PSYCHIATRIC FOLLOW-UP: ICD-10-CM

## 2024-04-25 DIAGNOSIS — Z86.59 HISTORY OF DEPRESSION: ICD-10-CM

## 2024-04-25 DIAGNOSIS — F79 INTELLECTUAL DISABILITY: ICD-10-CM

## 2024-04-25 DIAGNOSIS — F41.1 GENERALIZED ANXIETY DISORDER: ICD-10-CM

## 2024-04-25 PROCEDURE — 99214 OFFICE O/P EST MOD 30 MIN: CPT | Performed by: STUDENT IN AN ORGANIZED HEALTH CARE EDUCATION/TRAINING PROGRAM

## 2024-04-25 PROCEDURE — 90833 PSYTX W PT W E/M 30 MIN: CPT | Performed by: STUDENT IN AN ORGANIZED HEALTH CARE EDUCATION/TRAINING PROGRAM

## 2024-04-25 NOTE — PROGRESS NOTES
"Others Attending Appointment:  -Staff  *Presence necessary as independent historian due to intellectual/developmental disability and/or impaired communication abilities      #LAST INTERVENTION   Last seen about 2 months ago. Patient was psychiatrically and behaviorally stable. Abilify was decreased from 2.5 mg to 2 mg.     HISTORY OF PRESENT ILLNESS   #Interval change  -Patient reported to be at about their psychiatric/behavioral baseline  -No report of new issues/concerns    -Patient reports that she's not unsure about the status of her relationship      #Mood/Irritability   Worse?  -Patient reports that she had \"bad days and good days\" in equal amounts. Patient notes that she has recent stressors such as her relationship. Staff reports that what patient describes is how she's feeling usually in response to what happens at work.  No report of significant changes in mood, crying spells, frequent mood swings, or significant irritability.   No report of concern for depression from staff.  Does not endorse cardinal signs/symptoms of major depressive disorder.   Recall from prior: Feelings of sadness and anger since her boyfriend started ignoring her. However, staff reports that patient has been keeping busy with other activities like candle making.       #Behavior   Stable. As prior, patient reports that she's trying to spend money within her budget.   No report of physical aggression, significant property destruction, elopement, or self-injurious behavior.   Recall from prior: Report of issues with anger or impulse control. The staff reports that she gets angry when they try to restrict her shopping. There is a report of obsession over people and manipulation.      #Sleep  Stable. No reports of sleep changes.  Recall from prior: Reports difficulty falling asleep about once a month.      #Anxiety   Stable.  No report of excessive worrying, perseverating, or reassurance-seeking behavior.   No report of significant " "restlessness, pacing, or other signs suggesting psychomotor agitation.   No report of signs/symptoms consistent with separation anxiety or panic attacks.  Recall from prior: Patient notes that she is a little bit stressed because of her boyfriend. Patient notes that she is \"mad\" most of the time and this feeling is towards her boyfriend.      #Medication   Patient reports recently starting daily aspirin - prescribed by her PCP  -Endorses medication adherence.  -No report of concerns for medication side effects.   -No report of significant issues with constipation (beyond baseline chronic constipation), excessive sedation, drooling, dizziness, tremors, rigidity, or shuffling gait.  Recall from prior: Patient notes that she doesn't require medication for her irritability.      #Health    No report of hospitalizations or ED visits since last visit.  No report of significant change in health status, overall functioning, or non-psych medication  No report of significant change in appetite.   -Patient maintains regular follow up appointments with other multiple providers for her complex medical co-morbidities. No report of problem-based visits outside of regularly scheduled maintenance.  Recall from prior: Had allergy testing - allergic to dust and mold. Has scheduled weekly allergy shots.      REVIEW OF SYMPTOMS  -Depression/Mood: negative  -Anxiety: negative  -Psychosis: negative  -Valentina: negative  -ADHD: negative  -ODD: negative  -OCD: negative  -Sexually inappropriate behavior: none reported  -Sleep: No issues reported. No changes from baseline.    -Appetite: No issues reported. No report of pica. No changes from baseline  -Medical ROS: no report of difficulty urinating, seizures, rash, polydipsia, or constipation beyond baseline.  *All other systems have been reviewed and are negative for complaint unless otherwise noted above       PSYCHIATRIC/BEHAVIORAL HISTORY  -Prior diagnoses: depression, anxiety     -Was " previously seeing psychiatrist at [facility] (since ~2014)  -Therapist: sees a counselor at [facility], Esther Chi  -Past psych hospitalizations: 2014 and 2023 for SI/HI but per staff report was thought to be misunderstanding  -No reported history of violence  -No reported history of self-injurious behavior  -No reported history of suicide attempt  -Previous psych meds: Escitalopram  -Current psych meds:   --Buspar 15 mg qam, 15mg at noon and 10 mg at 7 pm  --Lamotrigine 150 mg at 7 pm  --Abilify 2 mg once daily at 7 pm        SOCIAL HISTORY  -Lives alone in an apartment; does not like to live with others  -Family: minimal involvement (very few family members left)  -Interests/hobbies: shopping, eating out ([employer]), hanging out with friend Patricia  -Care needs: staff only in the daytime, 4 to 5 hours a day, iADLs, some assistance with household chores reportedly because patient not wanting to rather than being unable to  -Work/School: attends day program/workshop  -Guardianship: guardian Gloria Alvarado   -Language/Communication: Good verbal fluency. Spontaneous speech. Good pragmatic language abilities.  -Cognitive abilities: Unknown history of formal testing. Able to read/write about middle school, fair money management abilities.    -Does not endorse ETOH or illicit drug use. No reported history of past substance abuse.  -Former smoker/occasional smoker  -No reported history of significant trauma   -No reported access to firearms            PAST MEDICAL HISTORY  -Hypertension  -Hypercholesterolemia  -Diabetes Mellitus  -Polyps and diverticulosis. Patient undergoes colonoscopy annually.   -PCP: Maxwell Patel  -Podiatrist: Pamela Aranda  -Optometrist: Rony Hidalgo  -Dentist: [facility]  -ObGyn: [facility]        ALLERGIES  -Penicillins  -Seasonal allergies           CURRENT MEDICATIONS  -Info caregiver and pharmacy  -Current psych meds:   --Buspar 15 mg qam, 15mg at noon and  10 mg at 7 pm  --Lamotrigine 150 mg at 7 pm  --Abilify 2.5 mg once daily at 7 pm        PHARMACY  -[facility]        FAMILY HISTORY  -No reported family history of suicide  -No reported family history of intellectual disability or autism spectrum disorder  -No reported family history of early sudden cardiac arrest        Mental Status Exam:     Appearance: adequate grooming   Build: average  Demeanor: average   Eye Contact: average   Motor Activity: Average, no PMA/PMR.  Musculoskeletal: No TD, tics, or tremor appreciated. AIMS score 0.   Mood: euthymic   Affect: full  Speech: Regular rate, rhythm, volume and tone. Good verbal fluency. Spontaneous speech. Good pragmatic language abilities.  Thought Process: [place]. Generally goal directed. Associations are logical.  Thought Content: Does not endorse suicidal or homicidal ideation, no delusions elicited.   Thought Perception: Does not endorse auditory or visual hallucinations. Does not appear to be responding to hallucinatory stimuli  Orientation: alert, oriented x 3  Attention/Concentration: normal  Cognition: absence of significant cognitive impairment; but possible borderline intellectual functioning  Insight: limited  Judgement: adequate      #Psychotherapy   -Provided 20 minutes of psychotherapy to patient and/or family/caregivers    -Psychotherapeutic interventions utilized:   --Supportive, Solutions-focused, Parent/Caregiver Training  -Target issues/Main topics discussed:  --Psychiatric symptoms, behavior, daily life, stressors, living situation, family/friends, hobbies/interests, interpersonal conflicts, trip to [place]  -Additional therapeutic interventions:   --Non-psychopharmacological Tx strategies were recommended. Family/caregivers provided with education using examples to illustrate and implementation advice offered.   -Response to therapy:  --Actively participated and responded favorably to the above psychotherapeutic interventions        Risk Assessment:  Patient felt to be at low acute and imminent risk of harm to self and others based on consideration of their risk and protective factors, as well as evaluation of their current clinical condition. Patient does not currently meet criteria for inpatient psychiatric hospitalization given that they do not exhibit evidence of clinically significant deterioration in baseline psychiatric illness, aggression, self-injury, or ability to care for themselves. There is no evidence that patient's current caregivers/living environment are unable to safely manage patient's behavior. Outpatient management is currently felt to be appropriate and in the best interest of the patient. Overall risk mitigated by psychiatric follow-up care, use of psychotropic medication, guardianship, and [place] services. Have engaged patient/caregivers in safety planning. They are aware of emergency psychiatric resources available in the event of an acute psychiatric emergency, such as Mobile Crisis, 911, and local ER.            IMPRESSION  Female with mild intellectual disability and a history of generalized anxiety disorder who initially presented for  new patient evaluation to establish care for management of psychotropic medication.     ###  As of this appointment:  Patient fairly stable, however some anxiety reported. Patient has a long standing struggle with assertiveness. Personality features make it difficult for her to confide to people or ask anything. This tendency seems to be contributing to current heightened state of anxiety and worrying over uncertainties. Most prominent being that patient wants to clarify her relationship status yet unable to broach topic with friend. Have encouraged patient and staff to set aside time daily for deliberate discussion of things that bother her in a safe supportive non-judgemental setting.  While initial plan was to continue tapering Abilify to discontinuation, will hold off  for now given its anxiolytic effects in setting of patient's higher than usual anxious distress. Will plan to try facilitating scheduling an appointment with Leticia Perez for psychotherapy.  Follow up in 4 to 6 weeks.      Diagnoses:  -Generalized anxiety disorder  -Major depressive disorder  -Mild intellectual disability  -Personality disorder, other specified        PLAN / MANAGEMENT / RECOMMENDATIONS                        #Visit Complexity  -Visit of high complexity given patient's intellectual/developmental disability and/or impaired communication abilities resulting in need for the presence of a third party to provide clinical information and history.    #Medication management   -Continue:  --Abilify 2 mg daily   --Buspar 15 mg qam and 10 mg at 7 pm  --Lamotrigine 150 mg once daily at 7 pm        #Medication Considerations  -Medication consent/assent:   Risks, benefits, alternatives, off-label uses, black box warnings, and frequent/important side effects of medications have been discussed with patient/caregiver. To the extent possible, they have voiced understanding and agreement with recommended use of psychotropic medication.     -Medical necessity for continued treatment with psychotropic medication:      [x] Symptom reduction        [] Improvement in functioning      [] Reduce risk of harm to self/others      [x] Maintenance therapy to prevent deterioration in functioning      -Rational for not reducing medication dose at this time:           [x] Significant risk of deterioration in functioning       [] Concern for elevating risk of harm to self/others      [] Prior dose reduction unsuccessful and/or harmful      [x] Psychiatric/behavioral condition not adequately stabilized/optimized       [x] Medication regimen recently modified          -OARRS  --I have personally reviewed patient's OARRS report      -Risk/Benefit assessment:  There is no report of signs/symptoms consistent with medication-induced  impairment in daily functioning. At this time, benefits of medication felt to outweigh potential risks. But we will continue to reassess need for psychotropic medication at regular 3 to 6 month intervals, or sooner as clinically indicated.      #Medication Monitoring Plan  -Labs due February 2025  --Labs to monitor: CBC, CMP, TSH/T4, lipid panel, Hgb A1c, EKG      #Psychotherapy with E/M services provided as documented above      #MDM/Complexity Issues    [x] Chronic medical comorbidities     [] Chronic risk of harm to self/others     [x] Intellectual/Developmental disability     [x] Need for independent historian due to intellectual/developmental disability            and/or  impaired communication abilities     [] Controlled substance medication requiring regular monitoring     [x] Medication requiring significant ongoing monitoring for toxicity (Abilify)      #Counseling Provided     [x] Diagnostic impression/prognosis      [x] Risks and benefits of treatment options     [x] Instruction for management/treatment and follow-up     [x] Educated patient/caregiver about: safety planning                  #Coordination of care provided    [] Family    [x] Caregiver/DSP/Staff       [x]Agency supervisor       [] Nursing staff      [] SSA/          [x]Therapist                     []Guardian         #Follow-up      -in about 4 to 6 weeks, or sooner if new/worsening symptoms         >> Scheduled virtual Follow-up Appt on 6/6/2024 at 15:00        Time  -Prep time on date of the patient encounter: 5 minutes  -Time spent directly with patient/family/caregiver: 40 minutes  -Additional time spent on patient care activities: 10 minutes  -Documentation time: 10 minutes  -Total time on date of patient encounter: 65 minutes       Scribe Attestation  By signing my name below, I, Barbi Ricks , Scribe   attest that this documentation has been prepared under the direction and in the presence of Gloria KEY  DO. Guerita

## 2024-04-25 NOTE — PATIENT INSTRUCTIONS
AFTER VISIT SUMMARY      Date: 4/25/2024  Appointment with Psychiatrist - Dr. Dexter      Reason for Visit:  -Routine follow-up/Medication management      Discussed during Appointment:   -Relationship  -Physical health, psychiatric/behavioral symptoms, daily functioning, new issues/concerns     -Treatment plan/management  -Medication      Clinical Impression/Status Update:  Patient fairly stable, however some anxiety reported. Patient has a long standing pattern in personality which makes it difficult for her to talk to people. This has been noticeable since she wants to be in a relationship with a gentle man and has not been able to approach him to talk about it. Encouraged staff to set aside about 15 minutes sessions for patient to talk about things that bother her,  intentionally talking about it and reassuring her.    Given significant anxiety, will leave Abilify at the current dose as it has anxiolytic benefits and try psychotherapy as well along side the medications to target the ongoing anxiety.      Will follow up with scheduling people for follow up with Leticia in psychotherapy.     -Risk/Benefit assessment:   Medical necessity for continued treatment with psychotropic medication:   There is no report of signs/symptoms consistent with medication-induced impairment in daily functioning. At this time, benefits of medication felt to outweigh potential risks. But we will continue to reassess need for psychotropic medication at regular 3 to 6 month intervals, or sooner as clinically indicated.      #Clinic Policies/Procedures  -Refills  Prescriptions will be sent to your pharmacy with enough refills to last until your next appointment. For established patients, we typically provide 6 refills for regular meds and 3 refills for controlled substances (e.g., ADHD stimulant medication, benzodiazepines such as lorazepam). We will not provide additional refills beyond that without having an appointment. You can  schedule an appointment by calling our office at [phone].     -Paperwork Requests (e.g., Expert Eval for guardianship)  We ask that you please schedule an appointment if needing paperwork filled out by the doctor (e.g., Expert Eval, FMLA form).  Please provide as much information as possible about your request and email the doctor any forms needing to be filled out prior to the appointment.       ===========================  INSTRUCTIONS/RECOMMENDATIONS  ===========================    #Medication   -No medication changes made today  --Continue taking your psych medications the same as usual    --Refills will be sent to your pharmacy    >>For prior authorization issues at the pharmacy -> Call the office and ask to talk to Nurse Gastelum.      #Technical Issues with Epic -> Please help us improve the Epic experience  To help identify issues needing to be fixed and improve patient care, please report any issues you experience with Epic or [employer], such as difficulty connecting to video during virtual visits.  [phone]      #Follow-up  --Your next appointment is scheduled for 6/6/2024 at 3:00pm (virtual visit with Gummii)    *If having new/worsening symptoms, please call the clinic ([phone]) to discuss being seen sooner   >>If unable to reach the office, send me an email at [email or web address]

## 2024-04-26 RX ORDER — ARIPIPRAZOLE 2 MG/1
TABLET ORAL
Qty: 30 TABLET | Refills: 6 | Status: SHIPPED | OUTPATIENT
Start: 2024-04-26

## 2024-04-30 ENCOUNTER — TELEPHONE (OUTPATIENT)
Dept: BEHAVIORAL HEALTH | Facility: CLINIC | Age: 59
End: 2024-04-30
Payer: MEDICARE

## 2024-04-30 PROBLEM — Z86.59 PSYCHIATRIC FOLLOW-UP: Status: ACTIVE | Noted: 2024-04-30

## 2024-04-30 PROBLEM — Z09 PSYCHIATRIC FOLLOW-UP: Status: ACTIVE | Noted: 2024-04-30

## 2024-06-06 ENCOUNTER — OFFICE VISIT (OUTPATIENT)
Dept: BEHAVIORAL HEALTH | Facility: CLINIC | Age: 59
End: 2024-06-06
Payer: MEDICARE

## 2024-06-06 DIAGNOSIS — Z86.59 PSYCHIATRIC FOLLOW-UP: ICD-10-CM

## 2024-06-06 DIAGNOSIS — F79 INTELLECTUAL DISABILITY: ICD-10-CM

## 2024-06-06 DIAGNOSIS — Z86.59 HISTORY OF DEPRESSION: ICD-10-CM

## 2024-06-06 DIAGNOSIS — F60.9 PERSONALITY DISORDER, UNSPECIFIED (MULTI): ICD-10-CM

## 2024-06-06 DIAGNOSIS — Z73.89 OTHER PROBLEMS RELATED TO LIFE MANAGEMENT DIFFICULTY: ICD-10-CM

## 2024-06-06 DIAGNOSIS — F41.1 GENERALIZED ANXIETY DISORDER: ICD-10-CM

## 2024-06-06 DIAGNOSIS — F32.9 MAJOR DEPRESSIVE DISORDER, REMISSION STATUS UNSPECIFIED, UNSPECIFIED WHETHER RECURRENT: ICD-10-CM

## 2024-06-06 DIAGNOSIS — Z09 PSYCHIATRIC FOLLOW-UP: ICD-10-CM

## 2024-06-06 PROCEDURE — 99214 OFFICE O/P EST MOD 30 MIN: CPT | Performed by: STUDENT IN AN ORGANIZED HEALTH CARE EDUCATION/TRAINING PROGRAM

## 2024-06-06 PROCEDURE — 90833 PSYTX W PT W E/M 30 MIN: CPT | Performed by: STUDENT IN AN ORGANIZED HEALTH CARE EDUCATION/TRAINING PROGRAM

## 2024-06-06 PROCEDURE — 90785 PSYTX COMPLEX INTERACTIVE: CPT | Performed by: STUDENT IN AN ORGANIZED HEALTH CARE EDUCATION/TRAINING PROGRAM

## 2024-06-06 NOTE — PROGRESS NOTES
"Others Attending Appointment:  -Staff  *Presence necessary as independent historian due to intellectual/developmental disability and/or impaired communication abilities      #LAST INTERVENTION   Last seen about 6 weeks ago. Patient was fairly stable. Abilify was not discontinued due to patient's higher than usual anxious distress. Plan was to facilitate scheduling with Leticia Perez for psychotherapy.      HISTORY OF PRESENT ILLNESS   #Interval Change  -Patient reported to be at about their psychiatric/behavioral baseline  -No report of new issues/concerns    -[employer] took her on vacation. Mentions that she wants to go on a cruise but hasn't made up her mind on where to go.  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 Patient reports that she is working on being more assertive and is able to tell people when she's upset.  No report of significant changes in mood, crying spells, frequent mood swings, or significant irritability.   No report of concern for depression from staff.  Recall from prior: Patient reports that she had \"bad days and good days\" in equal amounts. Patient notes that she has recent stressors such as her relationship. Staff reports that what patient describes is how she's feeling usually in response to what happens at work.  Does not endorse cardinal signs/symptoms of major depressive disorder.   Recall from prior: Feelings of sadness and anger since her boyfriend started ignoring her. However, staff reports that patient has been keeping busy with other activities like candle making.       #Behavior   Stable. As prior, patient reports that she's trying to spend money within her budget.   No report of physical aggression, significant property destruction, elopement, or self-injurious " "behavior.   Recall from prior: Report of issues with anger or impulse control. The staff reports that she gets angry when they try to restrict her shopping. There is a report of obsession over people and manipulation.      #Sleep  Stable. No reports of sleep changes.  Recall from prior: Reports difficulty falling asleep about once a month.      #Anxiety   Stable.  No report of excessive worrying, perseverating, or reassurance-seeking behavior.   No report of significant restlessness, pacing, or other signs suggesting psychomotor agitation.   No report of signs/symptoms consistent with separation anxiety or panic attacks.  Recall from prior: Patient notes that she is a little bit stressed because of her boyfriend. Patient notes that she is \"mad\" most of the time and this feeling is towards her boyfriend.      #Medication   -Endorses medication adherence.  -No report of concerns for medication side effects.   -No report of significant issues with constipation (beyond baseline chronic constipation), excessive sedation, drooling, dizziness, tremors, rigidity, or shuffling gait.  Recall from prior: Patient notes that she doesn't require medication for her irritability.      #Health    Patient reported to be making poor food choices.  No report of hospitalizations or ED visits since last visit.  No report of significant change in health status, overall functioning, or non-psych medication  No report of significant change in appetite.   Patient maintains regular follow up appointments with other multiple providers for her complex medical co-morbidities. No report of problem-based visits outside of regularly scheduled maintenance.  Recall from prior: Had allergy testing - allergic to dust and mold. Has scheduled weekly allergy shots.      REVIEW OF SYMPTOMS  -Depression/Mood: negative  -Anxiety: negative  -Psychosis: negative  -Valentina: negative  -ADHD: negative  -ODD: negative  -OCD: negative  -Sexually inappropriate " behavior: none reported  -Sleep: No issues reported. No changes from baseline.    -Appetite: No issues reported. No report of pica. No changes from baseline  -Medical ROS: no report of difficulty urinating, seizures, rash, polydipsia, or constipation beyond baseline.  *All other systems have been reviewed and are negative for complaint unless otherwise noted above       PSYCHIATRIC/BEHAVIORAL HISTORY  -Prior diagnoses: depression, anxiety     -Was previously seeing psychiatrist at [facility] (since ~2014)  -Therapist: sees a counselor at [facility], Esther Chi  -Past psych hospitalizations: 2014 and 2023 for SI/HI but per staff report was thought to be misunderstanding  -No reported history of violence  -No reported history of self-injurious behavior  -No reported history of suicide attempt  -Previous psych meds: Escitalopram  -Current psych meds:   --Buspar 15 mg qam, 15mg at noon and 10 mg at 7 pm  --Lamotrigine 150 mg at 7 pm  --Abilify 2 mg once daily at 7 pm        SOCIAL HISTORY  -Lives alone in an apartment; does not like to live with others  -Family: minimal involvement (very few family members left)  -Interests/hobbies: shopping, eating out ([employer]), hanging out with friend Patricia  -Care needs: staff only in the daytime, 4 to 5 hours a day, iADLs, some assistance with household chores reportedly because patient not wanting to rather than being unable to  -Work/School: attends day program/workshop  -Guardianship: guardian Gloria Alvarado   -Language/Communication: Good verbal fluency. Spontaneous speech. Good pragmatic language abilities.  -Cognitive abilities: Unknown history of formal testing. Able to read/write about middle school, fair money management abilities.    -Does not endorse ETOH or illicit drug use. No reported history of past substance abuse.  -Former smoker/occasional smoker  -No reported history of significant trauma   -No reported access to firearms            PAST  MEDICAL HISTORY  -Hypertension  -Hypercholesterolemia  -Diabetes Mellitus  -Polyps and diverticulosis. Patient undergoes colonoscopy annually.   -PCP: Maxwell Patel  -Podiatrist: Pamela Aranda  -Optometrist: Rony Hidalgo  -Dentist: [facility]  -ObGyn: [facility]        ALLERGIES  -Penicillins  -Seasonal allergies           CURRENT MEDICATIONS  -Info caregiver and pharmacy  -Current psych meds:   --Buspar 15 mg qam and 10 mg at 7 pm  --Lamotrigine 150 mg at 7 pm  --Abilify 2.5 mg once daily at 7 pm        PHARMACY  -[facility]        FAMILY HISTORY  -No reported family history of suicide  -No reported family history of intellectual disability or autism spectrum disorder  -No reported family history of early sudden cardiac arrest        Mental Status Exam:     Appearance: adequate grooming   Build: average  Demeanor: average   Eye Contact: average   Motor Activity: Average, no PMA/PMR.  Musculoskeletal: No TD, tics, or tremor appreciated. AIMS score 0.   Mood: euthymic   Affect: full  Speech: Regular rate, rhythm, volume and tone. Good verbal fluency. Spontaneous speech. Good pragmatic language abilities.  Thought Process: [place]. Generally goal directed. Associations are logical.  Thought Content: Does not endorse suicidal or homicidal ideation, no delusions elicited.   Thought Perception: Does not endorse auditory or visual hallucinations. Does not appear to be responding to hallucinatory stimuli  Orientation: alert, oriented x 3  Attention/Concentration: normal  Cognition: absence of significant cognitive impairment; but possible borderline intellectual functioning  Insight: limited  Judgement: adequate      #Psychotherapy   -Provided 20 minutes of psychotherapy to patient and/or family/caregivers    -Psychotherapeutic interventions utilized:   --Supportive, Solutions-focused, Parent/Caregiver Training  -Target issues/Main topics discussed:  --Psychiatric symptoms, behavior, daily  life, stressors, living situation, family/friends, hobbies/interests, interpersonal conflicts, trip to [place]  -Additional therapeutic interventions:   --Non-psychopharmacological Tx strategies were recommended. Family/caregivers provided with education using examples to illustrate and implementation advice offered.   -Response to therapy:  --Actively participated and responded favorably to the above psychotherapeutic interventions       Risk Assessment:  Patient felt to be at low acute and imminent risk of harm to self and others based on consideration of their risk and protective factors, as well as evaluation of their current clinical condition. Patient does not currently meet criteria for inpatient psychiatric hospitalization given that they do not exhibit evidence of clinically significant deterioration in baseline psychiatric illness, aggression, self-injury, or ability to care for themselves. There is no evidence that patient's current caregivers/living environment are unable to safely manage patient's behavior. Outpatient management is currently felt to be appropriate and in the best interest of the patient. Overall risk mitigated by psychiatric follow-up care, use of psychotropic medication, guardianship, and [place] services. Have engaged patient/caregivers in safety planning. They are aware of emergency psychiatric resources available in the event of an acute psychiatric emergency, such as Mobile Crisis, 911, and local ER.            IMPRESSION  Female with mild intellectual disability and a history of generalized anxiety disorder who initially presented for  new patient evaluation to establish care for management of psychotropic medication.     ###  As of this appointment:  Patient appears to have remained psychiatrically and behaviorally stable since last visit. Patient reported to be making changes on her assertiveness. Yet to book an appointment with Leticia Perez. Will reach out to  scheduling. Otherwise, no report of new issues/concerns.   Tolerating medication regimen without report of significant side effects.  For now, will continue current medication and maintain close follow-up.  Will f/u in 4 to 6 weeks        Diagnoses:  -Generalized anxiety disorder  -Major depressive disorder  -Mild intellectual disability  -Personality disorder, other specified        PLAN / MANAGEMENT / RECOMMENDATIONS                        #Visit Complexity  -Visit of high complexity given patient's intellectual/developmental disability and/or impaired communication abilities resulting in need for the presence of a third party to provide clinical information and history.    #Medication management   -Continue:  --Abilify 2 mg daily   --Buspar 15 mg qam and 10 mg at 7 pm  --Lamotrigine 150 mg once daily at 7 pm        #Medication Considerations  -Medication consent/assent:   Risks, benefits, alternatives, off-label uses, black box warnings, and frequent/important side effects of medications have been discussed with patient/caregiver. To the extent possible, they have voiced understanding and agreement with recommended use of psychotropic medication.     -Medical necessity for continued treatment with psychotropic medication:      [x] Symptom reduction        [] Improvement in functioning      [] Reduce risk of harm to self/others      [x] Maintenance therapy to prevent deterioration in functioning      -Rational for not reducing medication dose at this time:           [x] Significant risk of deterioration in functioning       [] Concern for elevating risk of harm to self/others      [] Prior dose reduction unsuccessful and/or harmful      [x] Psychiatric/behavioral condition not adequately stabilized/optimized       [x] Medication regimen recently modified          -OARRS  --I have personally reviewed patient's OARRS report      -Risk/Benefit assessment:  There is no report of signs/symptoms consistent with  medication-induced impairment in daily functioning. At this time, benefits of medication felt to outweigh potential risks. But we will continue to reassess need for psychotropic medication at regular 3 to 6 month intervals, or sooner as clinically indicated.      #Medication Monitoring Plan  -Labs due February 2025  --Labs to monitor: CBC, CMP, TSH/T4, lipid panel, Hgb A1c, EKG      #Psychotherapy with E/M services provided as documented above      #MDM/Complexity Issues    [x] Chronic medical comorbidities     [] Chronic risk of harm to self/others     [x] Intellectual/Developmental disability     [x] Need for independent historian due to intellectual/developmental disability            and/or  impaired communication abilities     [] Controlled substance medication requiring regular monitoring     [x] Medication requiring significant ongoing monitoring for toxicity (Abilify)      #Counseling Provided     [x] Diagnostic impression/prognosis      [x] Risks and benefits of treatment options     [x] Instruction for management/treatment and follow-up     [x] Educated patient/caregiver about: safety planning                  #Coordination of care provided    [] Family    [x] Caregiver/DSP/Staff       [x]Agency supervisor       [] Nursing staff      [] SSA/          [x]Therapist                     []Guardian         #Follow-up      -in about 4 to 6 weeks, or sooner if new/worsening symptoms         >> Scheduled virtual Follow-up Appt on 7/25/2024 at 15:00        Time  -Prep time on date of the patient encounter: 5 minutes  -Time spent directly with patient/family/caregiver: 40 minutes  -Additional time spent on patient care activities: 10 minutes  -Documentation time: 10 minutes  -Total time on date of patient encounter: 65 minutes       Scribe Attestation  By signing my name below, IBarbi , Scribe   attest that this documentation has been prepared under the direction and in the presence of  Gloria Dexter DO.

## 2024-06-07 NOTE — PATIENT INSTRUCTIONS
AFTER VISIT SUMMARY      Date: 6/6/2024  Appointment with Psychiatrist - Dr. Dexter      Reason for Visit:  -Routine follow-up/Medication management      Discussed during Appointment:   -Travel plans, bowling competition, relationship  -Physical health, psychiatric/behavioral symptoms, daily functioning, new issues/concerns     -Treatment plan/management  -Medication      Clinical Impression/Status Update:  Patient appears to have remained psychiatrically and behaviorally stable since last visit. Patient reported to be making changes on her assertiveness. Yet to book an appointment with Leticia Perez. Will reach out to Leticia about an appointment.  No report of new issues/concerns.   Tolerating medication regimen without report of significant side effects.  For now, will continue current medication and maintain close follow-up.      -Risk/Benefit assessment:   Medical necessity for continued treatment with psychotropic medication:   There is no report of signs/symptoms consistent with medication-induced impairment in daily functioning. At this time, benefits of medication felt to outweigh potential risks. But we will continue to reassess need for psychotropic medication at regular 3 to 6 month intervals, or sooner as clinically indicated.      #Clinic Policies/Procedures  -Refills  Prescriptions will be sent to your pharmacy with enough refills to last until your next appointment. For established patients, we typically provide 6 refills for regular meds and 3 refills for controlled substances (e.g., ADHD stimulant medication, benzodiazepines such as lorazepam). We will not provide additional refills beyond that without having an appointment. You can schedule an appointment by calling our office at [phone].     -Paperwork Requests (e.g., Expert Eval for guardianship)  We ask that you please schedule an appointment if needing paperwork filled out by the doctor (e.g., Expert Eval, FMLA form).  Please provide  as much information as possible about your request and email the doctor any forms needing to be filled out prior to the appointment.       ===========================  INSTRUCTIONS/RECOMMENDATIONS  ===========================    #Medication   -No medication changes made today  --Continue taking your psych medications the same as usual    --Refills will be sent to your pharmacy    >>For prior authorization issues at the pharmacy -> Call the office and ask to talk to Nurse Gastelum.      #Technical Issues with Epic -> Please help us improve the Epic experience  To help identify issues needing to be fixed and improve patient care, please report any issues you experience with Epic or [employer], such as difficulty connecting to video during virtual visits.  [phone]      #Follow-up  --Your next appointment is scheduled for 7/25/2024 at 3:00pm (virtual visit with "[employer]    *If having new/worsening symptoms, please call the clinic ([phone]) to discuss being seen sooner   >>If unable to reach the office, send me an email at [email or web address]

## 2024-07-25 ENCOUNTER — APPOINTMENT (OUTPATIENT)
Dept: BEHAVIORAL HEALTH | Facility: CLINIC | Age: 59
End: 2024-07-25
Payer: MEDICARE

## 2024-07-25 DIAGNOSIS — F32.9 MAJOR DEPRESSIVE DISORDER, REMISSION STATUS UNSPECIFIED, UNSPECIFIED WHETHER RECURRENT: ICD-10-CM

## 2024-07-25 DIAGNOSIS — F60.9 PERSONALITY DISORDER, UNSPECIFIED (MULTI): ICD-10-CM

## 2024-07-25 DIAGNOSIS — Z73.89 OTHER PROBLEMS RELATED TO LIFE MANAGEMENT DIFFICULTY: ICD-10-CM

## 2024-07-25 DIAGNOSIS — Z86.59 PSYCHIATRIC FOLLOW-UP: ICD-10-CM

## 2024-07-25 DIAGNOSIS — Z09 PSYCHIATRIC FOLLOW-UP: ICD-10-CM

## 2024-07-25 DIAGNOSIS — F41.1 GENERALIZED ANXIETY DISORDER: ICD-10-CM

## 2024-07-25 DIAGNOSIS — F79 INTELLECTUAL DISABILITY: ICD-10-CM

## 2024-07-25 DIAGNOSIS — Z63.79 OTHER STRESSFUL LIFE EVENTS AFFECTING FAMILY AND HOUSEHOLD: ICD-10-CM

## 2024-07-25 PROCEDURE — 90833 PSYTX W PT W E/M 30 MIN: CPT | Performed by: STUDENT IN AN ORGANIZED HEALTH CARE EDUCATION/TRAINING PROGRAM

## 2024-07-25 PROCEDURE — 99214 OFFICE O/P EST MOD 30 MIN: CPT | Performed by: STUDENT IN AN ORGANIZED HEALTH CARE EDUCATION/TRAINING PROGRAM

## 2024-07-25 NOTE — PROGRESS NOTES
"Others Attending Appointment:  -Staff  *Presence necessary as independent historian due to intellectual/developmental disability and/or impaired communication abilities      #LAST INTERVENTION   Last seen about 6 weeks ago. Patient appeared to be psychiatrically and behaviorally stable  Patient reported to be making changes on her assertiveness. No medication changes.    HISTORY OF PRESENT ILLNESS   #Interval Change  -Seeing a counselor every 3 weeks at [facility].  -Patient is seeing a new gentleman - an old friend. Wants him to come over to her place to spend the night.  -Staff reports that patient talks to multiple guys she finds online and she tags them her boyfriend after a few weeks of talking over the phone.      #Mood/Irritability   Patient reportedly likes to invite herself to people's homes and gets upset when they are not available.  Patient reportedly not open about her feelings. She notes that she talks to few people but that she'll like to talk to more people.  No report of significant changes in mood, crying spells, frequent mood swings, or significant irritability.   No report of concern for depression from staff.  Recall from prior: Patient reports that she is working on being more assertive and is able to tell people when she's upset.  Recall from prior: Patient reports that she had \"bad days and good days\" in equal amounts. Patient notes that she has recent stressors such as her relationship. Staff reports that what patient describes is how she's feeling usually in response to what happens at work.  Does not endorse cardinal signs/symptoms of major depressive disorder.   Recall from prior: Feelings of sadness and anger since her boyfriend started ignoring her. However, staff reports that patient has been keeping busy with other activities like candle making.       #Behavior   Stable.  No report of physical aggression, significant property destruction, elopement, or self-injurious behavior.   Recall " "from prior: Report of issues with anger or impulse control. The staff reports that she gets angry when they try to restrict her shopping. There is a report of obsession over people and manipulation.      #Sleep  Stable. No reports of sleep changes.  Recall from prior: Reports difficulty falling asleep about once a month.      #Anxiety   Stable.  No report of excessive worrying, perseverating, or reassurance-seeking behavior.   No report of significant restlessness, pacing, or other signs suggesting psychomotor agitation.   No report of signs/symptoms consistent with separation anxiety or panic attacks.  Recall from prior: Patient notes that she is a little bit stressed because of her boyfriend. Patient notes that she is \"mad\" most of the time and this feeling is towards her boyfriend.      #Medication   -Endorses medication adherence.  -No report of concerns for medication side effects.   -No report of significant issues with constipation (beyond baseline chronic constipation), excessive sedation, drooling, dizziness, tremors, rigidity, or shuffling gait.  Recall from prior: Patient notes that she doesn't require medication for her irritability.      #Health  As prior, patient still making poor food choices. Patient has gone to programs that teach healthy habits. She occasionally exercises by taking walks.  No report of hospitalizations or ED visits since last visit.  No report of significant change in health status, overall functioning, or non-psych medication  No report of significant change in appetite.   Patient maintains regular follow up appointments with other multiple providers for her complex medical co-morbidities. No report of problem-based visits outside of regularly scheduled maintenance.  Recall from prior: Patient reported to be making poor food choices.  Had allergy testing - allergic to dust and mold. Has scheduled weekly allergy shots.      REVIEW OF SYMPTOMS  -Depression/Mood: negative  -Anxiety: " negative  -Psychosis: negative  -Valentina: negative  -ADHD: negative  -ODD: negative  -OCD: negative  -Sexually inappropriate behavior: none reported  -Sleep: No issues reported. No changes from baseline.    -Appetite: No issues reported. No report of pica. No changes from baseline  -Medical ROS: no report of difficulty urinating, seizures, rash, polydipsia, or constipation beyond baseline.  *All other systems have been reviewed and are negative for complaint unless otherwise noted above       PSYCHIATRIC/BEHAVIORAL HISTORY  -Prior diagnoses: depression, anxiety     -Was previously seeing psychiatrist at [facility] (since ~2014)  -Therapist: sees a counselor at [facility], Esther Chi  -Past psych hospitalizations: 2014 and 2023 for SI/HI but per staff report was thought to be misunderstanding  -No reported history of violence  -No reported history of self-injurious behavior  -No reported history of suicide attempt  -Previous psych meds: Escitalopram  -Current psych meds:   --Buspar 15 mg qam, 15mg at noon and 10 mg at 7 pm  --Lamotrigine 150 mg at 7 pm  --Abilify 2 mg once daily at 7 pm        SOCIAL HISTORY  -Lives alone in an apartment; does not like to live with others  -Family: minimal involvement (very few family members left)  -Interests/hobbies: shopping, eating out ([employer]), hanging out with friend Patricia  -Care needs: staff only in the daytime, 4 to 5 hours a day, iADLs, some assistance with household chores reportedly because patient not wanting to rather than being unable to  -Work/School: attends day program/workshop  -Guardianship: guardian Gloria Alvarado   -Language/Communication: Good verbal fluency. Spontaneous speech. Good pragmatic language abilities.  -Cognitive abilities: Unknown history of formal testing. Able to read/write about middle school, fair money management abilities.    -Does not endorse ETOH or illicit drug use. No reported history of past substance abuse.  -Former  smoker/occasional smoker  -No reported history of significant trauma   -No reported access to firearms            PAST MEDICAL HISTORY  -Hypertension  -Hypercholesterolemia  -Diabetes Mellitus  -Polyps and diverticulosis. Patient undergoes colonoscopy annually.   -PCP: Maxwell Patel  -Podiatrist: Pamela Aranda  -Optometrist: Rony Hidalgo  -Dentist: [facility]  -ObGyn: [facility]        ALLERGIES  -Penicillins  -Seasonal allergies  -Dust  -Mold        CURRENT MEDICATIONS  -Info caregiver and pharmacy  -Current psych meds:   --Buspar 15 mg qam and 10 mg at 7 pm  --Lamotrigine 150 mg at 7 pm  --Abilify 2.5 mg once daily at 7 pm        PHARMACY  [email or web address]        FAMILY HISTORY  -No reported family history of suicide  -No reported family history of intellectual disability or autism spectrum disorder  -No reported family history of early sudden cardiac arrest        Mental Status Exam:     Appearance: adequate grooming   Build: average  Demeanor: average   Eye Contact: average   Motor Activity: Average, no PMA/PMR.  Musculoskeletal: No TD, tics, or tremor appreciated. AIMS score 0.   Mood: euthymic   Affect: full  Speech: Regular rate, rhythm, volume and tone. Good verbal fluency. Spontaneous speech. Good pragmatic language abilities.  Thought Process: Pearson. Generally goal directed. Associations are logical.  Thought Content: Does not endorse suicidal or homicidal ideation, no delusions elicited.   Thought Perception: Does not endorse auditory or visual hallucinations. Does not appear to be responding to hallucinatory stimuli  Orientation: alert, oriented x 3  Attention/Concentration: normal  Cognition: absence of significant cognitive impairment; but possible borderline intellectual functioning  Insight: limited  Judgement: adequate      #Psychotherapy   -Provided 20 minutes of psychotherapy to patient and/or family/caregivers    -Psychotherapeutic interventions utilized:    --Supportive, Solutions-focused, Parent/Caregiver Training  -Target issues/Main topics discussed:  --Psychiatric symptoms, behavior, daily life, stressors, living situation, family/friends, hobbies/interests, interpersonal conflicts, trip to [place]  -Additional therapeutic interventions:   --Non-psychopharmacological Tx strategies were recommended. Family/caregivers provided with education using examples to illustrate and implementation advice offered.   -Response to therapy:  --Actively participated and responded favorably to the above psychotherapeutic interventions       Risk Assessment:  Patient felt to be at low acute and imminent risk of harm to self and others based on consideration of their risk and protective factors, as well as evaluation of their current clinical condition. Patient does not currently meet criteria for inpatient psychiatric hospitalization given that they do not exhibit evidence of clinically significant deterioration in baseline psychiatric illness, aggression, self-injury, or ability to care for themselves. There is no evidence that patient's current caregivers/living environment are unable to safely manage patient's behavior. Outpatient management is currently felt to be appropriate and in the best interest of the patient. Overall risk mitigated by psychiatric follow-up care, use of psychotropic medication, guardianship, and [place] services. Have engaged patient/caregivers in safety planning. They are aware of emergency psychiatric resources available in the event of an acute psychiatric emergency, such as Mobile Crisis, 911, and local ER.            IMPRESSION  Female with mild intellectual disability and a history of generalized anxiety disorder who initially presented for  new patient evaluation to establish care for management of psychotropic medication.       ###  As of this appointment:  Patient appears to be psychiatrically and behaviorally stable. However, patient  reported to be pursuing relationships with men she finds online and struggling with rejection. Concern by staff that her expectations for a relationship are not compatible with these individuals and she has been getting blocked by them and this has been upsetting to patient. Will work on finding them resources to help patient understand social norms and expectations.  Follow up in 6 weeks.  ###    Diagnoses:  -Generalized anxiety disorder  -Major depressive disorder  -Mild intellectual disability  -Personality disorder, other specified        PLAN / MANAGEMENT / RECOMMENDATIONS                        #Visit Complexity  -Visit of high complexity given patient's intellectual/developmental disability and/or impaired communication abilities resulting in need for the presence of a third party to provide clinical information and history.    #Medication management   -Continue:  --Abilify 2 mg daily at 19:00  --Buspar 15 mg 7:00 and 10 mg at 20:00  --Lamotrigine 150 mg once daily at 19:00        #Medication Considerations  -Medication consent/assent:   Risks, benefits, alternatives, off-label uses, black box warnings, and frequent/important side effects of medications have been discussed with patient/caregiver. To the extent possible, they have voiced understanding and agreement with recommended use of psychotropic medication.     -Medical necessity for continued treatment with psychotropic medication:      [x] Symptom reduction        [] Improvement in functioning      [] Reduce risk of harm to self/others      [x] Maintenance therapy to prevent deterioration in functioning      -Rational for not reducing medication dose at this time:           [x] Significant risk of deterioration in functioning       [] Concern for elevating risk of harm to self/others      [] Prior dose reduction unsuccessful and/or harmful      [x] Psychiatric/behavioral condition not adequately stabilized/optimized       [x] Medication regimen  recently modified          -OARRS  --I have personally reviewed patient's OARRS report      -Risk/Benefit assessment:  There is no report of signs/symptoms consistent with medication-induced impairment in daily functioning. At this time, benefits of medication felt to outweigh potential risks. But we will continue to reassess need for psychotropic medication at regular 3 to 6 month intervals, or sooner as clinically indicated.      #Medication Monitoring Plan  -Labs due February 2025  --Labs to monitor: CBC, CMP, TSH/T4, lipid panel, Hgb A1c, EKG      #Psychotherapy with E/M services provided as documented above      #MDM/Complexity Issues    [x] Chronic medical comorbidities     [] Chronic risk of harm to self/others     [x] Intellectual/Developmental disability     [x] Need for independent historian due to intellectual/developmental disability            and/or  impaired communication abilities     [] Controlled substance medication requiring regular monitoring     [x] Medication requiring significant ongoing monitoring for toxicity (Abilify)      #Counseling Provided     [x] Diagnostic impression/prognosis      [x] Risks and benefits of treatment options     [x] Instruction for management/treatment and follow-up     [x] Educated patient/caregiver about: safety planning                  #Coordination of care provided    [] Family    [x] Caregiver/DSP/Staff       [x]Agency supervisor       [] Nursing staff      [] SSA/          [x]Therapist                     []Guardian         #Follow-up      -in about 4 to 6 weeks, or sooner if new/worsening symptoms         >> Scheduled virtual Follow-up Appt on 8/22/2024 at 15:00        Time  -Prep time on date of the patient encounter: 5 minutes  -Time spent directly with patient/family/caregiver: 65 minutes  -Additional time spent on patient care activities: 10 minutes  -Documentation time: 10 minutes  -Total time on date of patient encounter: 90 minutes        Scribe Attestation  By signing my name below, I, Barbi Dufflayton, Scribbabita   attest that this documentation has been prepared under the direction and in the presence of Gloria Dexter DO.

## 2024-07-26 NOTE — PATIENT INSTRUCTIONS
AFTER VISIT SUMMARY      Date: 7/25/2024  Appointment with Psychiatrist - Dr. Dexter      Reason for Visit:  -Routine follow-up/Medication management      Discussed during Appointment:   -Relationships  -Physical health, psychiatric/behavioral symptoms, daily functioning, new issues/concerns     -Treatment plan/management          Clinical Impression/Status Update:  Patient appears to be psychiatrically and behaviorally stable. However, patient reported to be pursuing relationships with men she finds online and struggling with rejection. Concern by staff that her expectations for a relationship are not compatible with these individuals and she has been getting blocked by them and this has been upsetting to patient. Will work on finding them resources to help patient understand social norms and expectations.      -Risk/Benefit assessment:   Medical necessity for continued treatment with psychotropic medication:   There is no report of signs/symptoms consistent with medication-induced impairment in daily functioning. At this time, benefits of medication felt to outweigh potential risks. But we will continue to reassess need for psychotropic medication at regular 3 to 6 month intervals, or sooner as clinically indicated.      #Clinic Policies/Procedures  -Refills  Prescriptions will be sent to your pharmacy with enough refills to last until your next appointment. For established patients, we typically provide 6 refills for regular meds and 3 refills for controlled substances (e.g., ADHD stimulant medication, benzodiazepines such as lorazepam). We will not provide additional refills beyond that without having an appointment. You can schedule an appointment by calling our office at [phone].     -Paperwork Requests (e.g., Expert Eval for guardianship)  We ask that you please schedule an appointment if needing paperwork filled out by the doctor (e.g., Expert Eval, FMLA form).  Please provide as much information as  possible about your request and email the doctor any forms needing to be filled out prior to the appointment.       ===========================  INSTRUCTIONS/RECOMMENDATIONS  ===========================    #Medication   -No medication changes made today  --Continue taking your psych medications the same as usual    --Refills will be sent to your pharmacy      >>For prior authorization issues at the pharmacy -> Call the office and ask to talk to Nurse Gastelum.      #Technical Issues with Epic -> Please help us improve the Epic experience  To help identify issues needing to be fixed and improve patient care, please report any issues you experience with Epic or Where Was it Filmed, such as difficulty connecting to video during virtual visits.  [phone]      #Follow-up  --Your next appointment is scheduled for 8/22/2024 at 3:00pm (virtual visit with Trippin In)    *If having new/worsening symptoms, please call the clinic ([phone]) to discuss being seen sooner   >>If unable to reach the office, send me an email at [email or web address]    For Patients: Who To Contact For What?  Fax: [phone]  Phone: [phone]  Zoom Link:   [email or web address]    For routine/non-urgent issues         o Scheduling: Send [employer] message or call the office ([phone])          o Refill Requests: Contact your pharmacy (not the office)         Note: your pharmacy will contact us to request a new prescription if no refills on file        o Pharmacy related issues (e.g., delay filling prescription):         o Paperwork requests: Call the office ([phone]) or send theRightAPIt message to schedule an appointment         o the[employer] account setup: Contact the Patient Support Line at [phone]         o Virtual visit problems (poor video quality): Contact the Patient Support Line at [phone]         o Other routine issues: Send the[employer]APIt message or call the office  ([phone])     For urgent clinical issues  o I can be reached by cell phone at [phone]  Please leave a detailed message describing your reason for calling,  the patient's name and date of birth, your contact info, and any other pertinent details    For clinical emergencies  o Please go to the nearest emergency room for evaluation

## 2024-08-22 ENCOUNTER — APPOINTMENT (OUTPATIENT)
Dept: BEHAVIORAL HEALTH | Facility: CLINIC | Age: 59
End: 2024-08-22
Payer: MEDICARE

## 2024-08-22 DIAGNOSIS — F41.1 GENERALIZED ANXIETY DISORDER: ICD-10-CM

## 2024-08-22 DIAGNOSIS — Z73.89 OTHER PROBLEMS RELATED TO LIFE MANAGEMENT DIFFICULTY: ICD-10-CM

## 2024-08-22 DIAGNOSIS — F79 INTELLECTUAL DISABILITY: ICD-10-CM

## 2024-08-22 DIAGNOSIS — Z86.59 PSYCHIATRIC FOLLOW-UP: ICD-10-CM

## 2024-08-22 DIAGNOSIS — F60.9 PERSONALITY DISORDER, UNSPECIFIED (MULTI): ICD-10-CM

## 2024-08-22 DIAGNOSIS — Z09 PSYCHIATRIC FOLLOW-UP: ICD-10-CM

## 2024-08-22 DIAGNOSIS — Z86.59 HISTORY OF DEPRESSION: ICD-10-CM

## 2024-08-22 PROCEDURE — 99214 OFFICE O/P EST MOD 30 MIN: CPT | Performed by: STUDENT IN AN ORGANIZED HEALTH CARE EDUCATION/TRAINING PROGRAM

## 2024-08-22 PROCEDURE — 90833 PSYTX W PT W E/M 30 MIN: CPT | Performed by: STUDENT IN AN ORGANIZED HEALTH CARE EDUCATION/TRAINING PROGRAM

## 2024-08-22 PROCEDURE — 90785 PSYTX COMPLEX INTERACTIVE: CPT | Performed by: STUDENT IN AN ORGANIZED HEALTH CARE EDUCATION/TRAINING PROGRAM

## 2024-08-22 NOTE — PROGRESS NOTES
"Others Attending Appointment:  -Staff  *Presence necessary as independent historian due to intellectual/developmental disability and/or impaired communication abilities      #LAST INTERVENTION   Last seen about 6 weeks ago. Patient appeared to be psychiatrically and behaviorally stable. However, patient reported to be pursuing relationships with men she finds online and struggling with rejection. Concern by staff that her expectations for a relationship are not compatible with these individuals and she has been getting blocked by them and this has been upsetting to patient. No medication changes.    HISTORY OF PRESENT ILLNESS   #Interval Change  -Patient appeared to be psychiatrically and behaviorally stable.   -Staff worries that she is not understanding or listening to the topics we are discussing(boyfriend/diet etc.). States she agrees to what they say but don't follow through.   -Continued discussion about dating/relationships.    #Mood/Irritability   -Stable  Patient reportedly likes to invite herself to people's homes and gets upset when they are not available.  Patient reportedly not open about her feelings. She notes that she talks to few people but that she'll like to talk to more people.  No report of significant changes in mood, crying spells, frequent mood swings, or significant irritability.   No report of concern for depression from staff.  Recall from prior: Patient reports that she is working on being more assertive and is able to tell people when she's upset.  Recall from prior: Patient reports that she had \"bad days and good days\" in equal amounts. Patient notes that she has recent stressors such as her relationship. Staff reports that what patient describes is how she's feeling usually in response to what happens at work.  Does not endorse cardinal signs/symptoms of major depressive disorder.   Recall from prior: Feelings of sadness and anger since her boyfriend started ignoring her. However, " "staff reports that patient has been keeping busy with other activities like candle making.       #Behavior   Stable.  No report of physical aggression, significant property destruction, elopement, or self-injurious behavior.   Recall from prior: Report of issues with anger or impulse control. The staff reports that she gets angry when they try to restrict her shopping. There is a report of obsession over people and manipulation.      #Sleep  Stable. No reports of sleep changes.  Recall from prior: Reports difficulty falling asleep about once a month.      #Anxiety   Stable.  No report of excessive worrying, perseverating, or reassurance-seeking behavior.   No report of significant restlessness, pacing, or other signs suggesting psychomotor agitation.   No report of signs/symptoms consistent with separation anxiety or panic attacks.  Recall from prior: Patient notes that she is a little bit stressed because of her boyfriend. Patient notes that she is \"mad\" most of the time and this feeling is towards her boyfriend.      #Medication   -Endorses medication adherence.  -No report of concerns for medication side effects.   -No report of significant issues with constipation (beyond baseline chronic constipation), excessive sedation, drooling, dizziness, tremors, rigidity, or shuffling gait.  Recall from prior: Patient notes that she doesn't require medication for her irritability.      #Health  As prior, patient still making poor food choices. Patient has gone to programs that teach healthy habits. She occasionally exercises by taking walks.  No report of hospitalizations or ED visits since last visit.  No report of significant change in health status, overall functioning, or non-psych medication  No report of significant change in appetite.   Patient maintains regular follow up appointments with other multiple providers for her complex medical co-morbidities. No report of problem-based visits outside of regularly " scheduled maintenance.  Recall from prior: Patient reported to be making poor food choices.  Had allergy testing - allergic to dust and mold. Has scheduled weekly allergy shots.      REVIEW OF SYMPTOMS  -Depression/Mood: negative  -Anxiety: negative  -Psychosis: negative  -Valentina: negative  -ADHD: negative  -ODD: negative  -OCD: negative  -Sexually inappropriate behavior: none reported  -Sleep: No issues reported. No changes from baseline.    -Appetite: No issues reported. No report of pica. No changes from baseline  -Medical ROS: no report of difficulty urinating, seizures, rash, polydipsia, or constipation beyond baseline.  *All other systems have been reviewed and are negative for complaint unless otherwise noted above       PSYCHIATRIC/BEHAVIORAL HISTORY  -Prior diagnoses: depression, anxiety     -Was previously seeing psychiatrist at [facility] (since ~2014)  -Therapist: sees a counselor at [facility], Esther Chi  -Past psych hospitalizations: 2014 and 2023 for SI/HI but per staff report was thought to be misunderstanding  -No reported history of violence  -No reported history of self-injurious behavior  -No reported history of suicide attempt  -Previous psych meds: Escitalopram  -Current psych meds:   --Buspar 15 mg qam, 15mg at noon and 10 mg at 7 pm  --Lamotrigine 150 mg at 7 pm  --Abilify 2 mg once daily at 7 pm        SOCIAL HISTORY  -Lives alone in an apartment; does not like to live with others  -Family: minimal involvement (very few family members left)  -Interests/hobbies: shopping, eating out ([employer]), hanging out with friend Patricia  -Care needs: staff only in the daytime, 4 to 5 hours a day, iADLs, some assistance with household chores reportedly because patient not wanting to rather than being unable to  -Work/School: attends day program/workshop  -Guardianship: guardian Gloria Alvarado   -Language/Communication: Good verbal fluency. Spontaneous speech. Good pragmatic language  abilities.  -Cognitive abilities: Unknown history of formal testing. Able to read/write about middle school, fair money management abilities.    -Does not endorse ETOH or illicit drug use. No reported history of past substance abuse.  -Former smoker/occasional smoker  -No reported history of significant trauma   -No reported access to firearms            PAST MEDICAL HISTORY  -Hypertension  -Hypercholesterolemia  -Diabetes Mellitus  -Polyps and diverticulosis. Patient undergoes colonoscopy annually.   -PCP: Maxwell Patel  -Podiatrist: Pamela Aranda  -Optometrist: Rony Hidalgo  -Dentist: [facility]  -ObGyn: [facility]        ALLERGIES  -Penicillins  -Seasonal allergies  -Dust  -Mold        CURRENT MEDICATIONS  -Info caregiver and pharmacy  -Current psych meds:   --Buspar 15 mg qam and 10 mg at 7 pm  --Lamotrigine 150 mg at 7 pm  --Abilify 2.5 mg once daily at 7 pm        PHARMACY  -[facility]        FAMILY HISTORY  -No reported family history of suicide  -No reported family history of intellectual disability or autism spectrum disorder  -No reported family history of early sudden cardiac arrest        Mental Status Exam:     Appearance: adequate grooming   Build: average  Demeanor: average   Eye Contact: average   Motor Activity: Average, no PMA/PMR.  Musculoskeletal: No TD, tics, or tremor appreciated. AIMS score 0.   Mood: euthymic   Affect: full  Speech: Regular rate, rhythm, volume and tone. Good verbal fluency. Spontaneous speech. Good pragmatic language abilities.  Thought Process: [place]. Generally goal directed. Associations are logical.  Thought Content: Does not endorse suicidal or homicidal ideation, no delusions elicited.   Thought Perception: Does not endorse auditory or visual hallucinations. Does not appear to be responding to hallucinatory stimuli  Orientation: alert, oriented x 3  Attention/Concentration: normal  Cognition: absence of significant cognitive impairment;  but possible borderline intellectual functioning  Insight: limited  Judgement: adequate      #Psychotherapy   -Provided 20 minutes of psychotherapy to patient and/or family/caregivers    -Psychotherapeutic interventions utilized:   --Supportive, Solutions-focused, Parent/Caregiver Training  -Target issues/Main topics discussed:  --Psychiatric symptoms, behavior, daily life, stressors, living situation, family/friends, hobbies/interests, interpersonal conflicts, trip to [place]  -Additional therapeutic interventions:   --Non-psychopharmacological Tx strategies were recommended. Family/caregivers provided with education using examples to illustrate and implementation advice offered.   -Response to therapy:  --Actively participated and responded favorably to the above psychotherapeutic interventions       Risk Assessment:  Patient felt to be at low acute and imminent risk of harm to self and others based on consideration of their risk and protective factors, as well as evaluation of their current clinical condition. Patient does not currently meet criteria for inpatient psychiatric hospitalization given that they do not exhibit evidence of clinically significant deterioration in baseline psychiatric illness, aggression, self-injury, or ability to care for themselves. There is no evidence that patient's current caregivers/living environment are unable to safely manage patient's behavior. Outpatient management is currently felt to be appropriate and in the best interest of the patient. Overall risk mitigated by psychiatric follow-up care, use of psychotropic medication, guardianship, and [place] services. Have engaged patient/caregivers in safety planning. They are aware of emergency psychiatric resources available in the event of an acute psychiatric emergency, such as Mobile Crisis, 911, and local ER.            IMPRESSION  Female with mild intellectual disability and a history of generalized anxiety disorder  who initially presented for  new patient evaluation to establish care for management of psychotropic medication.       ###  As of this appointment:  Patient appears to be psychiatrically and behaviorally stable. From her last appointment, she has begun talking to only one man at a time, but is still struggling with understanding the meaning of a boyfriend and rejection. Staff expressed concerns over the patient just agreeing with the staff and not fully understanding what they are wanting her to do as she does not change her behaviors after the conversation. Will work on finding them resources to help patient understand social norms and expectations.  Patient still seign [facility] specialist but recommended her to see Leticia. Planned to follow up with her and figure out scheduling issues.   Follow up in 6 weeks.  ###    Diagnoses:  -Generalized anxiety disorder  -Major depressive disorder  -Mild intellectual disability  -Personality disorder, other specified        PLAN / MANAGEMENT / RECOMMENDATIONS                        #Visit Complexity  -Visit of high complexity given patient's intellectual/developmental disability and/or impaired communication abilities resulting in need for the presence of a third party to provide clinical information and history.    #Medication management   -Continue:  --Abilify 2 mg daily at 19:00  --Buspar 15 mg 7:00 and 10 mg at 20:00  --Lamotrigine 150 mg once daily at 19:00        #Medication Considerations  -Medication consent/assent:   Risks, benefits, alternatives, off-label uses, black box warnings, and frequent/important side effects of medications have been discussed with patient/caregiver. To the extent possible, they have voiced understanding and agreement with recommended use of psychotropic medication.     -Medical necessity for continued treatment with psychotropic medication:      [x] Symptom reduction        [] Improvement in functioning      [] Reduce risk of harm  to self/others      [x] Maintenance therapy to prevent deterioration in functioning      -Rational for not reducing medication dose at this time:           [x] Significant risk of deterioration in functioning       [] Concern for elevating risk of harm to self/others      [] Prior dose reduction unsuccessful and/or harmful      [x] Psychiatric/behavioral condition not adequately stabilized/optimized       [x] Medication regimen recently modified          -OARRS  --I have personally reviewed patient's OARRS report      -Risk/Benefit assessment:  There is no report of signs/symptoms consistent with medication-induced impairment in daily functioning. At this time, benefits of medication felt to outweigh potential risks. But we will continue to reassess need for psychotropic medication at regular 3 to 6 month intervals, or sooner as clinically indicated.      #Medication Monitoring Plan  -Labs due February 2025  --Labs to monitor: CBC, CMP, TSH/T4, lipid panel, Hgb A1c, EKG      #Psychotherapy with E/M services provided as documented above      #MDM/Complexity Issues    [x] Chronic medical comorbidities     [] Chronic risk of harm to self/others     [x] Intellectual/Developmental disability     [x] Need for independent historian due to intellectual/developmental disability            and/or  impaired communication abilities     [] Controlled substance medication requiring regular monitoring     [x] Medication requiring significant ongoing monitoring for toxicity (Abilify)      #Counseling Provided     [x] Diagnostic impression/prognosis      [x] Risks and benefits of treatment options     [x] Instruction for management/treatment and follow-up     [x] Educated patient/caregiver about: safety planning                  #Coordination of care provided    [] Family    [x] Caregiver/DSP/Staff       [x]Agency supervisor       [] Nursing staff      [] SSA/          [x]Therapist                     []Guardian          #Follow-up      -in about 4 to 6 weeks, or sooner if new/worsening symptoms         >> Scheduled virtual Follow-up Appt on 09/26/2024 at 15:00        Time  -Prep time on date of the patient encounter: 5 minutes  -Time spent directly with patient/family/caregiver: 40 minutes  -Additional time spent on patient care activities: 15 minutes  -Documentation time: 10 minutes  -Total time on date of patient encounter: 70 minutes       Scribe Attestation  By signing my name below, I, Shruthi Manjarrez   attest that this documentation has been prepared under the direction and in the presence of Gloria Dexter DO.

## 2024-08-28 ENCOUNTER — TELEPHONE (OUTPATIENT)
Dept: BEHAVIORAL HEALTH | Facility: CLINIC | Age: 59
End: 2024-08-28
Payer: MEDICARE

## 2024-09-12 ENCOUNTER — TELEPHONE (OUTPATIENT)
Dept: BEHAVIORAL HEALTH | Facility: CLINIC | Age: 59
End: 2024-09-12
Payer: MEDICARE

## 2024-10-17 ENCOUNTER — APPOINTMENT (OUTPATIENT)
Dept: BEHAVIORAL HEALTH | Facility: CLINIC | Age: 59
End: 2024-10-17
Payer: MEDICARE

## 2024-10-17 DIAGNOSIS — Z86.59 PSYCHIATRIC FOLLOW-UP: ICD-10-CM

## 2024-10-17 DIAGNOSIS — Z63.8: ICD-10-CM

## 2024-10-17 DIAGNOSIS — Z09 PSYCHIATRIC FOLLOW-UP: ICD-10-CM

## 2024-10-17 DIAGNOSIS — F41.1 GENERALIZED ANXIETY DISORDER: ICD-10-CM

## 2024-10-17 DIAGNOSIS — F39 MOOD DISORDER (CMS-HCC): ICD-10-CM

## 2024-10-17 DIAGNOSIS — F60.9 PERSONALITY DISORDER, UNSPECIFIED: ICD-10-CM

## 2024-10-17 DIAGNOSIS — Z86.59 HISTORY OF DEPRESSION: ICD-10-CM

## 2024-10-17 DIAGNOSIS — F79 INTELLECTUAL DISABILITY: ICD-10-CM

## 2024-10-17 DIAGNOSIS — Z73.89 OTHER PROBLEMS RELATED TO LIFE MANAGEMENT DIFFICULTY: ICD-10-CM

## 2024-10-17 PROCEDURE — 99214 OFFICE O/P EST MOD 30 MIN: CPT | Performed by: STUDENT IN AN ORGANIZED HEALTH CARE EDUCATION/TRAINING PROGRAM

## 2024-10-17 PROCEDURE — 90833 PSYTX W PT W E/M 30 MIN: CPT | Performed by: STUDENT IN AN ORGANIZED HEALTH CARE EDUCATION/TRAINING PROGRAM

## 2024-10-17 PROCEDURE — G2211 COMPLEX E/M VISIT ADD ON: HCPCS | Performed by: STUDENT IN AN ORGANIZED HEALTH CARE EDUCATION/TRAINING PROGRAM

## 2024-10-17 PROCEDURE — 90785 PSYTX COMPLEX INTERACTIVE: CPT | Performed by: STUDENT IN AN ORGANIZED HEALTH CARE EDUCATION/TRAINING PROGRAM

## 2024-10-17 RX ORDER — ARIPIPRAZOLE 2 MG/1
TABLET ORAL
Qty: 30 TABLET | Refills: 6 | Status: SHIPPED | OUTPATIENT
Start: 2024-10-17

## 2024-10-17 RX ORDER — BUSPIRONE HYDROCHLORIDE 10 MG/1
TABLET ORAL
Qty: 75 TABLET | Refills: 6 | Status: SHIPPED | OUTPATIENT
Start: 2024-10-17

## 2024-10-17 RX ORDER — LAMOTRIGINE 150 MG/1
TABLET ORAL
Qty: 30 TABLET | Refills: 6 | Status: SHIPPED | OUTPATIENT
Start: 2024-10-17

## 2024-10-17 SDOH — SOCIAL STABILITY - SOCIAL INSECURITY: OTHER SPECIFIED PROBLEMS RELATED TO PRIMARY SUPPORT GROUP: Z63.8

## 2024-10-17 NOTE — PATIENT INSTRUCTIONS
AFTER VISIT SUMMARY      Date: 10/17/2024  Appointment with Psychiatrist - Dr. Dexter      Reason for Visit:  -Routine follow-up/Medication management      Discussed during Appointment:   -Physical health, psychiatric/behavioral symptoms, daily functioning, new issues/concerns     -Treatment plan/management, including medication      Clinical Impression/Status Update:  Patient appears to be psychiatrically and behaviorally stable. No new issues/concerns. Patient making progress knowing some people might not want to be in a relationship with her and is okay being friends. Has people she can talk to when she wants. Currently started Ozempic with no reported side effects. Able to give herself injections. Planning for costumes for halloween.  -Current medication regimen appears to be appropriately effective without experiencing significant or bothersome side effects.  --We will continue current medications and see her again in 6 weeks.      -Risk/Benefit assessment:   Medical necessity for continued treatment with psychotropic medication:   There is no report of signs/symptoms consistent with medication-induced impairment in daily functioning. At this time, benefits of medication felt to outweigh potential risks. But we will continue to reassess need for psychotropic medication at regular 3 to 6 month intervals, or sooner as clinically indicated.      #Clinic Policies/Procedures  -Refills  Prescriptions will be sent to your pharmacy with enough refills to last until your next appointment. For established patients, we typically provide 6 refills for regular meds and 3 refills for controlled substances (e.g., ADHD stimulant medication, benzodiazepines such as lorazepam). We will not provide additional refills beyond that without having an appointment. You can schedule an appointment by sending a [employer] message or calling our office at [phone].     -Paperwork Requests (e.g., Expert Eval for guardianship)  We ask  that you please schedule an appointment if needing paperwork filled out by the doctor (e.g., Expert Eval, FMLA form).  Please provide as much information as possible about your request and email the doctor any forms needing to be filled out prior to the appointment.       ===========================  INSTRUCTIONS/RECOMMENDATIONS  ===========================    #Medication   -No medication changes made today  --Continue taking your psych medications the same as usual    --Refills will be sent to your pharmacy    >>For prior authorization issues at the pharmacy -> Call the office and ask to talk to Nurse Gastelum.       [employer] - Online Patient Portal   For Help activating your [employer] Account or setting up Proxy Access, please call the dedicated Patient Support Line at [phone].    If having technical Issues with Epic  or [employer]-> Please help us improve the Epic experience  To help identify issues needing to be fixed and improve patient care, please report any issues you experience with Epic or [employer]Social, such as difficulty connecting to video during virtual visits.  [phone]      #Follow-up  --Your next appointment is scheduled for 11/21/2024 at 2:00 pm (virtual visit with [facility])    *If having new/worsening symptoms, please send a [employer] message or call the clinic ([phone]) to discuss being seen sooner   >>If unable to reach the office, send me an email at [email or web address]

## 2024-10-17 NOTE — PROGRESS NOTES
"Others Attending Appointment:  -Staff  *Presence necessary as independent historian due to intellectual/developmental disability and/or impaired communication abilities      #LAST INTERVENTION   Last seen about 8 weeks ago. Patient appeared to be psychiatrically and behaviorally stable. Had discussions about dating/relationships. Patient seeing [facility] health specialist but recommended that she sees Leticia Perez. No medication changes.        HISTORY OF PRESENT ILLNESS   #Interval Change  -Patient has an upcoming ship cruise to [facility] in January 2025  -Patient reports that she is stressed out by her new relationship  -Patient is currently on Ozempic      #Mood/Irritability   Stable.  No report of significant changes in mood, crying spells, frequent mood swings, or significant irritability.   No report of concern for depression from staff.  Recall from prior: Patient reportedly likes to invite herself to people's homes and gets upset when they are not available.  Patient reportedly not open about her feelings. She notes that she talks to few people but that she'll like to talk to more people.  Recall from prior: Patient reports that she is working on being more assertive and is able to tell people when she's upset.  Recall from prior: Patient reports that she had \"bad days and good days\" in equal amounts. Patient notes that she has recent stressors such as her relationship. Staff reports that what patient describes is how she's feeling usually in response to what happens at work.  Does not endorse cardinal signs/symptoms of major depressive disorder.   Recall from prior: Feelings of sadness and anger since her boyfriend started ignoring her. However, staff reports that patient has been keeping busy with other activities like candle making.       #Behavior   Stable.  No report of physical aggression, significant property destruction, elopement, or self-injurious behavior.   Recall from prior: Report of " "issues with anger or impulse control. The staff reports that she gets angry when they try to restrict her shopping. There is a report of obsession over people and manipulation.      #Sleep  Stable. No reports of sleep changes.  Recall from prior: Reports difficulty falling asleep about once a month.      #Anxiety   Stable.  No report of excessive worrying, perseverating, or reassurance-seeking behavior.   No report of significant restlessness, pacing, or other signs suggesting psychomotor agitation.   No report of signs/symptoms consistent with separation anxiety or panic attacks.  Recall from prior: Patient notes that she is a little bit stressed because of her boyfriend. Patient notes that she is \"mad\" most of the time and this feeling is towards her boyfriend.      #Medication   -Patient is currently on Ozempic with no reported side effects  -Endorses medication adherence.  -No report of concerns for medication side effects.   -No report of significant issues with constipation (beyond baseline chronic constipation), excessive sedation, drooling, dizziness, tremors, rigidity, or shuffling gait.  Recall from prior: Patient notes that she doesn't require medication for her irritability.      #Health  Stable.  No report of hospitalizations or ED visits since last visit.  No report of significant change in health status, overall functioning, or non-psych medication  No report of significant change in appetite.   Patient maintains regular follow up appointments with other multiple providers for her complex medical co-morbidities. No report of problem-based visits outside of regularly scheduled maintenance.  Recall from prior: patient still making poor food choices. Patient has gone to programs that teach healthy habits. She occasionally exercises by taking walks.  Had allergy testing - allergic to dust and mold. Has scheduled weekly allergy shots.      REVIEW OF SYMPTOMS  -Depression/Mood: negative  -Anxiety: " negative  -Psychosis: negative  -Valentina: negative  -ADHD: negative  -ODD: negative  -OCD: negative  -Sexually inappropriate behavior: none reported  -Sleep: No issues reported. No changes from baseline.    -Appetite: No issues reported. No report of pica. No changes from baseline  -Medical ROS: no report of difficulty urinating, seizures, rash, polydipsia, or constipation beyond baseline.  *All other systems have been reviewed and are negative for complaint unless otherwise noted above       PSYCHIATRIC/BEHAVIORAL HISTORY  -Prior diagnoses: depression, anxiety     -Was previously seeing psychiatrist at [facility] (since ~2014)  -Therapist: sees a counselor at [facility], Esther Chi  -Past psych hospitalizations: 2014 and 2023 for SI/HI but per staff report was thought to be misunderstanding  -No reported history of violence  -No reported history of self-injurious behavior  -No reported history of suicide attempt  -Previous psych meds: Escitalopram  -Current psych meds:   --Buspar 15 mg qam, 15mg at noon and 10 mg at 7 pm  --Lamotrigine 150 mg at 7 pm  --Abilify 2 mg once daily at 7 pm        SOCIAL HISTORY  -Lives alone in an apartment; does not like to live with others  -Family: minimal involvement (very few family members left)  -Interests/hobbies: shopping, eating out ([employer]), hanging out with friend Patricia  -Care needs: staff only in the daytime, 4 to 5 hours a day, iADLs, some assistance with household chores reportedly because patient not wanting to rather than being unable to  -Work/School: attends day program/workshop  -Guardianship: guardian Gloria Alvarado   -Language/Communication: Good verbal fluency. Spontaneous speech. Good pragmatic language abilities.  -Cognitive abilities: Unknown history of formal testing. Able to read/write about middle school, fair money management abilities.    -Does not endorse ETOH or illicit drug use. No reported history of past substance abuse.  -Former  smoker/occasional smoker  -No reported history of significant trauma   -No reported access to firearms            PAST MEDICAL HISTORY  -Hypertension  -Hypercholesterolemia  -Diabetes Mellitus  -Polyps and diverticulosis. Patient undergoes colonoscopy annually.   -PCP: Maxwell Patel  -Podiatrist: Pamela Aranda  -Optometrist: Rony Hidalgo  -Dentist: [facility]  -ObGyn: [facility]        ALLERGIES  -Penicillins  -Seasonal allergies  -Dust  -Mold        CURRENT MEDICATIONS  -Info caregiver and pharmacy  -Current psych meds:   --Buspar 15 mg qam and 10 mg at 7 pm  --Lamotrigine 150 mg at 7 pm  --Abilify 2.5 mg once daily at 7 pm        PHARMACY  -[facility]        FAMILY HISTORY  -No reported family history of suicide  -No reported family history of intellectual disability or autism spectrum disorder  -No reported family history of early sudden cardiac arrest        Mental Status Exam:     Appearance: adequate grooming   Build: average  Demeanor: average   Eye Contact: average   Motor Activity: Average, no PMA/PMR.  Musculoskeletal: No TD, tics, or tremor appreciated. AIMS score 0.   Mood: euthymic   Affect: full  Speech: Regular rate, rhythm, volume and tone. Good verbal fluency. Spontaneous speech. Good pragmatic language abilities.  Thought Process: [place]. Generally goal directed. Associations are logical.  Thought Content: Does not endorse suicidal or homicidal ideation, no delusions elicited.   Thought Perception: Does not endorse auditory or visual hallucinations. Does not appear to be responding to hallucinatory stimuli  Orientation: alert, oriented x 3  Attention/Concentration: normal  Cognition: absence of significant cognitive impairment; but possible borderline intellectual functioning  Insight: limited  Judgement: adequate      #Psychotherapy   -Provided 20 minutes of psychotherapy to patient and/or family/caregivers    -Psychotherapeutic interventions utilized:    --Supportive, Solutions-focused,   -Target issues/Main topics discussed:  --Psychiatric symptoms, behavior, daily life, stressors, living situation, family/friends, hobbies/interests, interpersonal conflicts, dating, vacation/travel    -Additional therapeutic interventions:   --Non-psychopharmacological Tx strategies were recommended. Family/caregivers provided with education using examples to illustrate and implementation advice offered.   -Response to therapy:  --Actively participated and responded favorably to the above psychotherapeutic interventions       Risk Assessment:  Patient felt to be at low acute and imminent risk of harm to self and others based on consideration of their risk and protective factors, as well as evaluation of their current clinical condition. Patient does not currently meet criteria for inpatient psychiatric hospitalization given that they do not exhibit evidence of clinically significant deterioration in baseline psychiatric illness, aggression, self-injury, or ability to care for themselves. There is no evidence that patient's current caregivers/living environment are unable to safely manage patient's behavior. Outpatient management is currently felt to be appropriate and in the best interest of the patient. Overall risk mitigated by psychiatric follow-up care, use of psychotropic medication, guardianship, and [place] services. Have engaged patient/caregivers in safety planning. They are aware of emergency psychiatric resources available in the event of an acute psychiatric emergency, such as Mobile Crisis, 911, and local ER.            IMPRESSION  Female with mild intellectual disability and a history of generalized anxiety disorder who initially presented for  new patient evaluation to establish care for management of psychotropic medication.       ###  As of this appointment:  Patient appears to be psychiatrically and behaviorally stable. No new issues/concerns. Patient  making progress with regards to social norms knowing some people might not want to be in a relationship with her and is okay being friends. Has people she can talk to when she wants. Currently started Ozempic with no reported side effects. Able to give herself injections. Planning for costumes for halloween. Will continue to monitor and see her again in 6 weeks.  ###    Diagnoses:  -Generalized anxiety disorder  -Mood disorder  -Mild intellectual disability  -Personality disorder, other specified        PLAN / MANAGEMENT / RECOMMENDATIONS                        #Visit Complexity  -Visit of high complexity given patient's intellectual/developmental disability and/or impaired communication abilities resulting in need for the presence of a third party to provide clinical information and history.    #Medication management   -Continue:  --Abilify 2 mg daily at 19:00  --Buspar 15 mg 7:00 and 10 mg at 20:00  --Lamotrigine 150 mg once daily at 19:00        #Medication Considerations  -Medication consent/assent:   Risks, benefits, alternatives, off-label uses, black box warnings, and frequent/important side effects of medications have been discussed with patient/caregiver. To the extent possible, they have voiced understanding and agreement with recommended use of psychotropic medication.     -Medical necessity for continued treatment with psychotropic medication:      [x] Symptom reduction        [] Improvement in functioning      [] Reduce risk of harm to self/others      [x] Maintenance therapy to prevent deterioration in functioning      -Rational for not reducing medication dose at this time:           [x] Significant risk of deterioration in functioning       [] Concern for elevating risk of harm to self/others      [] Prior dose reduction unsuccessful and/or harmful      [x] Psychiatric/behavioral condition not adequately stabilized/optimized       [x] Medication regimen recently modified          -OARRS  --I have  personally reviewed patient's OARRS report      -Risk/Benefit assessment:  There is no report of signs/symptoms consistent with medication-induced impairment in daily functioning. At this time, benefits of medication felt to outweigh potential risks. But we will continue to reassess need for psychotropic medication at regular 3 to 6 month intervals, or sooner as clinically indicated.      #Medication Monitoring Plan  -Labs due February 2025  --Labs to monitor: CBC, CMP, TSH/T4, lipid panel, Hgb A1c, EKG      #Psychotherapy with E/M services provided as documented above      #MDM/Complexity Issues    [x] Chronic medical comorbidities     [] Chronic risk of harm to self/others     [x] Intellectual/Developmental disability     [x] Need for independent historian due to intellectual/developmental disability            and/or  impaired communication abilities     [] Controlled substance medication requiring regular monitoring     [x] Medication requiring significant ongoing monitoring for toxicity (Abilify)      #Counseling Provided     [x] Diagnostic impression/prognosis      [x] Risks and benefits of treatment options     [x] Instruction for management/treatment and follow-up     [x] Educated patient/caregiver about: safety planning                  #Coordination of care provided    [] Family    [x] Caregiver/DSP/Staff       [x]Agency supervisor       [] Nursing staff      [] SSA/          [x]Therapist                     []Guardian         #Follow-up      -in about 4 to 6 weeks, or sooner if new/worsening symptoms         >> Scheduled virtual Follow-up Appt on 11/21/2024 at 14:00        Time  -Prep time on date of the patient encounter: 5 minutes  -Time spent directly with patient/family/caregiver: 40 minutes  -Additional time spent on patient care activities: 15 minutes  -Documentation time: 10 minutes  -Total time on date of patient encounter: 70 minutes       Scribe Attestation  By signing my name below,  I, Barbi DuffShruthi traylor   attest that this documentation has been prepared under the direction and in the presence of Gloria Dexter DO.

## 2024-11-21 ENCOUNTER — APPOINTMENT (OUTPATIENT)
Dept: BEHAVIORAL HEALTH | Facility: CLINIC | Age: 59
End: 2024-11-21
Payer: MEDICARE

## 2024-11-21 DIAGNOSIS — F79 INTELLECTUAL DISABILITY: ICD-10-CM

## 2024-11-21 DIAGNOSIS — Z09 PSYCHIATRIC FOLLOW-UP: ICD-10-CM

## 2024-11-21 DIAGNOSIS — F41.1 GENERALIZED ANXIETY DISORDER: ICD-10-CM

## 2024-11-21 DIAGNOSIS — F60.9 PERSONALITY DISORDER, UNSPECIFIED: ICD-10-CM

## 2024-11-21 DIAGNOSIS — Z63.8: ICD-10-CM

## 2024-11-21 DIAGNOSIS — Z86.59 PSYCHIATRIC FOLLOW-UP: ICD-10-CM

## 2024-11-21 DIAGNOSIS — F39 MOOD DISORDER (CMS-HCC): ICD-10-CM

## 2024-11-21 PROCEDURE — 99214 OFFICE O/P EST MOD 30 MIN: CPT | Performed by: STUDENT IN AN ORGANIZED HEALTH CARE EDUCATION/TRAINING PROGRAM

## 2024-11-21 PROCEDURE — G2211 COMPLEX E/M VISIT ADD ON: HCPCS | Performed by: STUDENT IN AN ORGANIZED HEALTH CARE EDUCATION/TRAINING PROGRAM

## 2024-11-21 PROCEDURE — 90785 PSYTX COMPLEX INTERACTIVE: CPT | Performed by: STUDENT IN AN ORGANIZED HEALTH CARE EDUCATION/TRAINING PROGRAM

## 2024-11-21 PROCEDURE — 90833 PSYTX W PT W E/M 30 MIN: CPT | Performed by: STUDENT IN AN ORGANIZED HEALTH CARE EDUCATION/TRAINING PROGRAM

## 2024-11-21 SDOH — SOCIAL STABILITY - SOCIAL INSECURITY: OTHER SPECIFIED PROBLEMS RELATED TO PRIMARY SUPPORT GROUP: Z63.8

## 2024-11-21 NOTE — PROGRESS NOTES
"Others Attending Appointment:  -Staff  *Presence necessary as independent historian due to intellectual/developmental disability and/or impaired communication abilities      #LAST INTERVENTION   Last seen about 5 weeks ago in October 2024. Patient appears to be psychiatrically and behaviorally stable. No new issues/concerns. Patient reported making progress knowing some people might not want to be in a relationship with her and is okay being friends. Patient started Ozempic with no reported side effects and able to give herself injections. No medication changes.      HISTORY OF PRESENT ILLNESS   #Interval Change  -Patient appears to have remained psychiatrically and behaviorally stable since last visit.  -No report of new issues/concerns  -Patient still reporting relationship issues which she notes is her only stressor    #Mood/Irritability   Stable  No report of significant changes in mood, crying spells, frequent mood swings, or significant irritability.   No report of concern for depression from staff.  Recall from prior: Patient reportedly likes to invite herself to people's homes and gets upset when they are not available.  Patient reportedly not open about her feelings. She notes that she talks to few people but that she'll like to talk to more people.  Recall from prior: Patient reports that she is working on being more assertive and is able to tell people when she's upset.  Recall from prior: Patient reports that she had \"bad days and good days\" in equal amounts. Patient notes that she has recent stressors such as her relationship. Staff reports that what patient describes is how she's feeling usually in response to what happens at work.  Does not endorse cardinal signs/symptoms of major depressive disorder.   Recall from prior: Feelings of sadness and anger since her boyfriend started ignoring her. However, staff reports that patient has been keeping busy with other activities like candle making. " "      #Behavior   Stable  No report of physical aggression, significant property destruction, elopement, or self-injurious behavior.   Recall from prior: Report of issues with anger or impulse control. The staff reports that she gets angry when they try to restrict her shopping. There is a report of obsession over people and manipulation.      #Sleep  Stable. No reports of sleep changes.  Recall from prior: Reports difficulty falling asleep about once a month.      #Anxiety   Stable. Patient reports that she's stressed out by her relationship issues.  No report of excessive worrying, perseverating, or reassurance-seeking behavior.   No report of significant restlessness, pacing, or other signs suggesting psychomotor agitation.   No report of signs/symptoms consistent with separation anxiety or panic attacks.  Recall from prior: Patient notes that she is a little bit stressed because of her boyfriend. Patient notes that she is \"mad\" most of the time and this feeling is towards her boyfriend.      #Medication   Patient reports that she doesn't want new medications.  -Endorses medication adherence.  -No report of concerns for medication side effects.   -No report of significant issues with constipation (beyond baseline chronic constipation), excessive sedation, drooling, dizziness, tremors, rigidity, or shuffling gait.  Recall from prior: Patient is on Ozempic with no reported side effects  Recall from prior: Patient notes that she doesn't require medication for her irritability.      #Health  Stable.  No report of hospitalizations or ED visits since last visit.  No report of significant change in health status, overall functioning, or non-psych medication  No report of significant change in appetite.   Patient maintains regular follow up appointments with other multiple providers for her complex medical co-morbidities. No report of problem-based visits outside of regularly scheduled maintenance.  Recall from prior: " patient still making poor food choices. Patient has gone to programs that teach healthy habits. She occasionally exercises by taking walks.  Had allergy testing - allergic to dust and mold. Has scheduled weekly allergy shots.      REVIEW OF SYMPTOMS  -Depression/Mood: negative  -Anxiety: negative  -Psychosis: negative  -Valentina: negative  -ADHD: negative  -ODD: negative  -OCD: negative  -Sexually inappropriate behavior: none reported  -Sleep: No issues reported. No changes from baseline.    -Appetite: No issues reported. No report of pica. No changes from baseline  -Medical ROS: no report of difficulty urinating, seizures, rash, polydipsia, or constipation beyond baseline.  *All other systems have been reviewed and are negative for complaint unless otherwise noted above       PSYCHIATRIC/BEHAVIORAL HISTORY  -Prior diagnoses: depression, anxiety     -Was previously seeing psychiatrist at [facility] (since ~2014)  -Therapist: sees a counselor at [facility], Esther Chi  -Past psych hospitalizations: 2014 and 2023 for SI/HI but per staff report was thought to be misunderstanding  -No reported history of violence  -No reported history of self-injurious behavior  -No reported history of suicide attempt  -Previous psych meds: Escitalopram  -Current psych meds:   --Buspar 15 mg qam, 15mg at noon and 10 mg at 7 pm  --Lamotrigine 150 mg at 7 pm  --Abilify 2 mg once daily at 7 pm        SOCIAL HISTORY  -Lives alone in an apartment; does not like to live with others  -Family: minimal involvement (very few family members left)  -Interests/hobbies: shopping, eating out ([employer]), hanging out with friend Patricia  -Care needs: staff only in the daytime, 4 to 5 hours a day, iADLs, some assistance with household chores reportedly because patient not wanting to rather than being unable to  -Work/School: attends day program/workshop  -Guardianship: guardian Gloria Alvarado   -Language/Communication: Good verbal fluency.  Spontaneous speech. Good pragmatic language abilities.  -Cognitive abilities: Unknown history of formal testing. Able to read/write about middle school, fair money management abilities.    -Does not endorse ETOH or illicit drug use. No reported history of past substance abuse.  -Former smoker/occasional smoker  -No reported history of significant trauma   -No reported access to firearms            PAST MEDICAL HISTORY  -Hypertension  -Hypercholesterolemia  -Diabetes Mellitus  -Polyps and diverticulosis. Patient undergoes colonoscopy annually.   -PCP: Maxwell Patel  -Podiatrist: Pamela Aranda  -Optometrist: Rony Hidalgo  -Dentist: [facility]  -ObGyn: [facility]        ALLERGIES  -Penicillins  -Seasonal allergies  -Dust  -Mold        CURRENT MEDICATIONS  -Info caregiver and pharmacy  -Current psych meds:   --Buspar 15 mg qam and 10 mg at 7 pm  --Lamotrigine 150 mg at 7 pm  --Abilify 2.5 mg once daily at 7 pm        PHARMACY  -"[facility]"        FAMILY HISTORY  -No reported family history of suicide  -No reported family history of intellectual disability or autism spectrum disorder  -No reported family history of early sudden cardiac arrest        #Mental Status Exam:     Appearance: adequate grooming   Demeanor: average   Eye Contact: average   Motor Activity: Average, no PMA/PMR.  Musculoskeletal: No TD, tics, or tremor appreciated. AIMS score 0.   Mood: euthymic   Affect: full  Speech: Regular rate, rhythm, volume and tone. Good verbal fluency. Spontaneous speech. Good pragmatic language abilities.  Thought Process: [place]. Generally goal directed. Associations are logical.  Thought Content: Does not endorse suicidal or homicidal ideation, no delusions elicited.   Thought Perception: Does not endorse auditory or visual hallucinations. Does not appear to be responding to hallucinatory stimuli  Orientation: alert, oriented x 3  Attention/Concentration: average  Cognition: mild  intellectual disability vs borderline intellectual functioning  Insight: limited  Judgement: adequate      #Psychotherapy   -Provided 25 minutes of psychotherapy to patient and/or family/caregivers    -Psychotherapeutic interventions utilized: supportive, coping skills, DBT, solutions-focused,   -Target issues/Main topics discussed:  --Psychiatric symptoms, behavior, daily life, stressors, living situation, family/friends, hobbies/interests, interpersonal conflicts, dating and related, social norms, safe sex, vacation/travel    -Response to therapy: actively participated and responded favorably to the above psychotherapeutic interventions         # Risk Assessment:  Patient felt to be at low acute and imminent risk of harm to self and others based on consideration of their risk and protective factors, as well as evaluation of their current clinical condition. Patient does not currently meet criteria for inpatient psychiatric hospitalization given that they do not exhibit evidence of clinically significant deterioration in baseline psychiatric illness, aggression, self-injury, or ability to care for themselves. There is no evidence that patient's current caregivers/living environment are unable to safely manage patient's behavior. Outpatient management is currently felt to be appropriate and in the best interest of the patient. Overall risk mitigated by psychiatric follow-up care, use of psychotropic medication, guardianship, and [place] services. Have engaged patient/caregivers in safety planning. They are aware of emergency psychiatric resources available in the event of an acute psychiatric emergency, such as Mobile Crisis, 911, and local ER.          ______________________________  IMPRESSION  Female with mild intellectual disability and a history of generalized anxiety disorder who initially presented for  new patient evaluation to establish care for management of psychotropic medication.       ###  As of  this appointment:  Patient appears to be psychiatrically and behaviorally stable. No new issues/concerns. Like past two appointments, patient's dating life continues to be a source of significant anxiety. Discussed with patient how some anxiety is normal in this kind of setting but that if she feels too stressed out by it then it might not be worth pursuing. For now, will continue to monitor and make no medication changes. Will plan for follow-up in about 4 to 6 weeks  ###      Diagnoses:  -Generalized anxiety disorder  -Major depressive disorder  -Mild intellectual disability  -Personality disorder, other specified        PLAN / MANAGEMENT / RECOMMENDATIONS                          #Visit Complexity  -Visit of high complexity given patient's intellectual/developmental disability and/or impaired communication abilities resulting in need for the presence of a third party to provide clinical information and history.    #Medication management   -Continue:  --Abilify 2 mg daily at 19:00  --Buspar 15 mg 7:00 and 10 mg at 20:00  --Lamotrigine 150 mg once daily at 19:00        #Medication Considerations  -Medication consent/assent:   Risks, benefits, alternatives, off-label uses, black box warnings, and frequent/important side effects of medications have been discussed with patient/caregiver. To the extent possible, they have voiced understanding and agreement with recommended use of psychotropic medication.     -Medical necessity for continued treatment with psychotropic medication:      [x] Symptom reduction        [] Improvement in functioning      [] Reduce risk of harm to self/others      [x] Maintenance therapy to prevent deterioration in functioning      -Rational for not reducing medication dose at this time:           [x] Significant risk of deterioration in functioning       [] Concern for elevating risk of harm to self/others      [] Prior dose reduction unsuccessful and/or harmful      [x]  Psychiatric/behavioral condition not adequately stabilized/optimized       [x] Medication regimen recently modified          -OARRS  --I have personally reviewed patient's OARRS report      -Risk/Benefit assessment:  There is no report of signs/symptoms consistent with medication-induced impairment in daily functioning. At this time, benefits of medication felt to outweigh potential risks. But we will continue to reassess need for psychotropic medication at regular 3 to 6 month intervals, or sooner as clinically indicated.      #Medication Monitoring Plan  -Labs due February 2025  --Labs to monitor: CBC, CMP, TSH/T4, lipid panel, Hgb A1c, EKG      #Psychotherapy with E/M services provided as documented above      #MDM/Complexity Issues    [x] Chronic medical comorbidities     [] Chronic risk of harm to self/others     [x] Intellectual/Developmental disability     [x] Need for independent historian due to intellectual/developmental disability            and/or  impaired communication abilities     [] Controlled substance medication requiring regular monitoring     [x] Medication requiring significant ongoing monitoring for toxicity (Abilify)      #Counseling Provided     [x] Diagnostic impression/prognosis      [x] Risks and benefits of treatment options     [x] Instruction for management/treatment and follow-up     [x] Educated patient/caregiver about: safety planning                  #Coordination of care provided    [] Family    [x] Caregiver/DSP/Staff       [x]Agency supervisor       [] Nursing staff      [] SSA/          [x]Therapist                     []Guardian         #Follow-up      -in about 4 to 6 weeks, or sooner if new/worsening symptoms         >> Scheduled virtual Follow-up Appt on 1/23/2025 at 14:00        Time  -Prep time on date of the patient encounter: 5 minutes  -Time spent directly with patient/family/caregiver: 40 minutes  -Additional time spent on patient care activities: 15  minutes  -Documentation time: 10 minutes  -Total time on date of patient encounter: 70 minutes       Scribe Attestation  By signing my name below, I, [facility]bi [facility] [place], Scribe   attest that this documentation has been prepared under the direction and in the presence of Gloria Dexter DO.

## 2024-11-22 NOTE — PATIENT INSTRUCTIONS
AFTER VISIT SUMMARY      Date: 11/21/2024  Appointment with Psychiatrist - Dr. Dexter      Reason for Visit:  -Routine follow-up/Medication management      Discussed during Appointment:   -Physical health, psychiatric/behavioral symptoms, daily functioning, new issues/concerns     -Treatment plan/management, including medication      Clinical Impression/Status Update:  Patient appears to be psychiatrically and behaviorally stable. No new issues/concerns. Like past two appointments, patient's dating life continues to be a source of anxiety. Discussed normalizing worry and making her see if it is too worrisome, may be worth it to give up on the adventure. Some anxiety is normal in this kind of setting. Continue to encourage safe dating strategies.  -Current medication regimen appears to be appropriately effective without experiencing significant or bothersome side effects.  --We will continue current medications and see her again in 4 to 6 weeks.      -Risk/Benefit assessment:   Medical necessity for continued treatment with psychotropic medication:   There is no report of signs/symptoms consistent with medication-induced impairment in daily functioning. At this time, benefits of medication felt to outweigh potential risks. But we will continue to reassess need for psychotropic medication at regular 3 to 6 month intervals, or sooner as clinically indicated.      #Clinic Policies/Procedures  -Refills  Prescriptions will be sent to your pharmacy with enough refills to last until your next appointment. For established patients, we typically provide 6 refills for regular meds and 3 refills for controlled substances (e.g., ADHD stimulant medication, benzodiazepines such as lorazepam). We will not provide additional refills beyond that without having an appointment. You can schedule an appointment by sending a [employer] message or calling our office at [phone].     -Paperwork Requests (e.g., Expert Eval for  guardianship)  We ask that you please schedule an appointment if needing paperwork filled out by the doctor (e.g., Expert Eval, FMLA form).  Please provide as much information as possible about your request and email the doctor any forms needing to be filled out prior to the appointment.       ===========================  INSTRUCTIONS/RECOMMENDATIONS  ===========================    #Medication   -No medication changes made today  --Continue taking your psych medications the same as usual    --Refills will be sent to your pharmacy    >>For prior authorization issues at the pharmacy -> Call the office and ask to talk to Nurse Gastelum.       [employer] - Online Patient Portal   For Help activating your [employer] Account or setting up Proxy Access, please call the dedicated Patient Support Line at [phone].    If having technical Issues with Epic  or [employer]-> Please help us improve the Epic experience  To help identify issues needing to be fixed and improve patient care, please report any issues you experience with Epic or  [employer], such as difficulty connecting to video during virtual visits.  [phone]      #Follow-up  --Your next appointment is scheduled for 1/23/2025 at 2:00 pm (virtual visit with [facility])    *If having new/worsening symptoms, please send a [employer] message or call the clinic ([phone]) to discuss being seen sooner   >>If unable to reach the office, send me an email at [email or web address]

## 2025-01-14 PROBLEM — Z63.8: Status: ACTIVE | Noted: 2025-01-14

## 2025-01-23 ENCOUNTER — APPOINTMENT (OUTPATIENT)
Dept: BEHAVIORAL HEALTH | Facility: CLINIC | Age: 60
End: 2025-01-23
Payer: MEDICARE

## 2025-01-23 DIAGNOSIS — Z79.899 LONG TERM CURRENT USE OF ANTIPSYCHOTIC MEDICATION: ICD-10-CM

## 2025-01-23 DIAGNOSIS — Z01.89 ASSESSMENT OF EFFECTS OF PSYCHOTROPIC DRUG: ICD-10-CM

## 2025-01-23 DIAGNOSIS — F60.9 PERSONALITY DISORDER, UNSPECIFIED: ICD-10-CM

## 2025-01-23 DIAGNOSIS — F39 MOOD DISORDER (CMS-HCC): ICD-10-CM

## 2025-01-23 DIAGNOSIS — Z91.89 AT RISK FOR SIDE EFFECT OF MEDICATION: ICD-10-CM

## 2025-01-23 DIAGNOSIS — F79 INTELLECTUAL DISABILITY: ICD-10-CM

## 2025-01-23 DIAGNOSIS — Z86.59 PSYCHIATRIC FOLLOW-UP: ICD-10-CM

## 2025-01-23 DIAGNOSIS — Z09 PSYCHIATRIC FOLLOW-UP: ICD-10-CM

## 2025-01-23 DIAGNOSIS — F41.1 GENERALIZED ANXIETY DISORDER: ICD-10-CM

## 2025-01-23 DIAGNOSIS — Z79.899 ASSESSMENT OF EFFECTS OF PSYCHOTROPIC DRUG: ICD-10-CM

## 2025-01-23 PROCEDURE — 90833 PSYTX W PT W E/M 30 MIN: CPT | Performed by: STUDENT IN AN ORGANIZED HEALTH CARE EDUCATION/TRAINING PROGRAM

## 2025-01-23 PROCEDURE — 90785 PSYTX COMPLEX INTERACTIVE: CPT | Performed by: STUDENT IN AN ORGANIZED HEALTH CARE EDUCATION/TRAINING PROGRAM

## 2025-01-23 PROCEDURE — 99214 OFFICE O/P EST MOD 30 MIN: CPT | Performed by: STUDENT IN AN ORGANIZED HEALTH CARE EDUCATION/TRAINING PROGRAM

## 2025-01-23 NOTE — PROGRESS NOTES
"Others Attending Appointment:  -Piedad (Staff)  *Presence necessary as independent historian due to intellectual/developmental disability and/or impaired communication abilities      #LAST INTERVENTION   Last seen about 2 months ago in November 2024. Report of anxiety in setting of patient's dating life. No medication changes.        HISTORY OF PRESENT ILLNESS   #Interval Change  -No report of significant change in patient's overall psychiatric and behavioral condition since last visit.  -Currently due for yearly monitoring labs  -Saw PCP for routine follow up.       #Mood/Irritability   Stable.  No report of significant changes in mood, crying spells, frequent mood swings, or significant irritability.   No report of concern for depression from staff.  Recall from prior: Patient reportedly likes to invite herself to people's homes and gets upset when they are not available.  Patient reportedly not open about her feelings. She notes that she talks to few people but that she'll like to talk to more people.  Recall from prior: Patient reports that she is working on being more assertive and is able to tell people when she's upset.  Recall from prior: Patient reports that she had \"bad days and good days\" in equal amounts. Patient notes that she has recent stressors such as her relationship. Staff reports that what patient describes is how she's feeling usually in response to what happens at work.  Does not endorse cardinal signs/symptoms of major depressive disorder.   Recall from prior: Feelings of sadness and anger since her boyfriend started ignoring her. However, staff reports that patient has been keeping busy with other activities like candle making.       #Behavior   Stable. No report of challenging behavior.  No report of physical aggression, significant property destruction, elopement, or self-injurious behavior.   Recall from prior: Report of issues with anger or impulse control. The staff reports that she gets " "angry when they try to restrict her shopping. There is a report of obsession over people and manipulation.      #Sleep  Stable. No report of sleep difficulty.  Recall from prior: Reports difficulty falling asleep about once a month.      #Anxiety   Stable.   Report of ongoing anxiety in setting of her romantic relationships described as about the same since last appointment  No report of excessive worrying, perseverating, or reassurance-seeking behavior.   No report of significant restlessness, pacing, or other signs suggesting psychomotor agitation.   No report of signs/symptoms consistent with separation anxiety or panic attacks.  Recall from prior: Patient notes that she is a little bit stressed because of her boyfriend. Patient notes that she is \"mad\" most of the time and this feeling is towards her boyfriend.      #Medication   -Endorses medication adherence.  -No report of concerns for medication side effects.   -No report of new or significant/bothersome medication side effects.   Recall from prior: Patient is on Ozempic (October 2024) with no reported side effects  Recall from prior: Patient notes that she doesn't require medication for her irritability.      #Health  Patient reports that she lost about 20 pounds on Ozempic and that she believes she has gained back the weight after her ship cruise. Staff reports that patient has not gained weight.  No report of hospitalizations or ED visits since last visit.  No report of significant change in health status, overall functioning, or non-psych medication  No report of significant change in appetite.   Patient maintains regular follow up appointments with other multiple providers for her complex medical co-morbidities. No report of problem-based visits outside of regularly scheduled maintenance.  Recall from prior: patient still making poor food choices. Patient has gone to programs that teach healthy habits. She occasionally exercises by taking walks.  Had " allergy testing - allergic to dust and mold. Has scheduled weekly allergy shots.      REVIEW OF SYMPTOMS  -Depression/Mood: negative  -Anxiety: negative  -Psychosis: negative  -Valentina: negative  -ADHD: negative  -ODD: negative  -OCD: negative  -Sexually inappropriate behavior: none reported  -Sleep: No issues reported. No changes from baseline.    -Appetite: No issues reported. No report of pica. No changes from baseline  -Medical ROS: no report of difficulty urinating, seizures, rash, polydipsia, or constipation beyond baseline.  *All other systems have been reviewed and are negative for complaint unless otherwise noted above       PSYCHIATRIC/BEHAVIORAL HISTORY  -Prior diagnoses: depression, anxiety     -Was previously seeing psychiatrist at [facility] (since ~2014)  -Therapist: sees a counselor at [facility], Esther Chi  -Past psych hospitalizations: 2014 and 2023 for SI/HI but per staff report was thought to be misunderstanding  -No reported history of violence  -No reported history of self-injurious behavior  -No reported history of suicide attempt  -Previous psych meds: Escitalopram  -Current psych meds:   --Buspar 15 mg qam, 15mg at noon and 10 mg at 7 pm  --Lamotrigine 150 mg at 7 pm  --Abilify 2 mg once daily at 7 pm        SOCIAL HISTORY  -Lives alone in an apartment; does not like to live with others  -Family: minimal involvement (very few family members left)  -Interests/hobbies: shopping, eating out ([employer]), hanging out with friend Patricia  -Care needs: staff only in the daytime, 4 to 5 hours a day, iADLs, some assistance with household chores reportedly because patient not wanting to rather than being unable to  -Work/School: attends day program/workshop  -Guardianship: guardian Gloria Christiano   -Language/Communication: Good verbal fluency. Spontaneous speech. Good pragmatic language abilities.  -Cognitive abilities: Unknown history of formal testing. Able to read/write about middle  school, fair money management abilities.    -Does not endorse ETOH or illicit drug use. No reported history of past substance abuse.  -Former smoker/occasional smoker  -No reported history of significant trauma   -No reported access to firearms            PAST MEDICAL HISTORY  -Hypertension  -Hypercholesterolemia  -Diabetes Mellitus  -Polyps and diverticulosis. Patient undergoes colonoscopy annually.   -PCP: Maxwell Patel  -Podiatrist: Pamela Aranda  -Optometrist: Rony Hidalgo  -Dentist: [facility]  -ObGyn: [facility]        ALLERGIES  -Penicillins  -Seasonal allergies  -Dust  -Mold        CURRENT MEDICATIONS  -Info caregiver and pharmacy  -Current psych meds:   --Buspar 15 mg qam and 10 mg at 7 pm  --Lamotrigine 150 mg at 7 pm  --Abilify 2.5 mg once daily at 7 pm        PHARMACY  -[facility] [facility]        FAMILY HISTORY  -No reported family history of suicide  -No reported family history of intellectual disability or autism spectrum disorder  -No reported family history of early sudden cardiac arrest        #Mental Status Exam:     Appearance: adequate grooming   Demeanor: average   Eye Contact: average   Motor Activity: Average, no PMA/PMR.  Musculoskeletal: No TD, tics, or tremor appreciated. AIMS score 0.   Mood: euthymic   Affect: full  Speech: Regular rate, rhythm, volume and tone. Good verbal fluency. Spontaneous speech. Good pragmatic language abilities.  Thought Process: [place]. Generally goal directed. Associations are logical.  Thought Content: Does not endorse suicidal or homicidal ideation, no delusions elicited.   Thought Perception: Does not endorse auditory or visual hallucinations. Does not appear to be responding to hallucinatory stimuli  Orientation: alert, oriented x 3  Attention/Concentration: average  Cognition: mild intellectual disability vs borderline intellectual functioning  Insight: limited  Judgement: adequate      #Psychotherapy   -Provided 25 minutes of  psychotherapy to patient and/or family/caregivers    -Psychotherapeutic interventions utilized: supportive, coping skills, DBT, solutions-focused,   -Target issues/Main topics discussed: psychiatric symptoms, behavior, daily life, stressors, living situation, family/friends, hobbies/interests, interpersonal conflicts, dating and related, social norms, safe sex, vacation/travel    -Response to therapy: actively participated and responded favorably to the above psychotherapeutic interventions         # Risk Assessment:  Patient felt to be at low acute and imminent risk of harm to self and others based on consideration of their risk and protective factors, as well as evaluation of their current clinical condition. Patient does not currently meet criteria for inpatient psychiatric hospitalization given that they do not exhibit evidence of clinically significant deterioration in baseline psychiatric illness, aggression, self-injury, or ability to care for themselves. There is no evidence that patient's current caregivers/living environment are unable to safely manage patient's behavior. Outpatient management is currently felt to be appropriate and in the best interest of the patient. Overall risk mitigated by psychiatric follow-up care, use of psychotropic medication, guardianship, and [facility] services. Have engaged patient/caregivers in safety planning. They are aware of emergency psychiatric resources available in the event of an acute psychiatric emergency, such as Mobile Crisis, 911, and local ER.          ______________________________  IMPRESSION  Female with mild intellectual disability and a history of generalized anxiety disorder who initially presented for  new patient evaluation to establish care for management of psychotropic medication.       ###  As of this appointment:  -No report of significant change in patient's overall psychiatric and behavioral condition since last visit.  -No report of  significant new issues/concerns.  -Currently due for yearly monitoring labs. Will place orders. Reminded patient/caregiver of need to get labs done before next appointment  -Appears to be tolerating medication regimen without report of significant or bothersome side effects.  -Will continue current medication regimen and plan for follow-up in about 2 months.  ###        Diagnoses:  -Generalized anxiety disorder  -Major depressive disorder  -Mild intellectual disability  -Personality disorder, other specified        PLAN / MANAGEMENT / RECOMMENDATIONS                          #Visit Complexity  -Visit of high complexity given patient's intellectual/developmental disability and/or impaired communication abilities resulting in need for the presence of a third party (staff) to provide clinical information and history.    #Medication management   -Continue:  --Abilify 2 mg daily at 19:00  --Buspar 15 mg 7:00 and 10 mg at 20:00  --Lamotrigine 150 mg once daily at 19:00        #Medication Considerations  -Medication consent/assent:   Risks, benefits, alternatives, off-label uses, black box warnings, and frequent/important side effects of medications have been discussed with patient/caregiver. To the extent possible, they have voiced understanding and agreement with recommended use of psychotropic medication.     -Medical necessity for continued treatment with psychotropic medication:      [] Symptom reduction        [] Improvement in functioning      [] Maintenance therapy to reduce risk of harm to self/others      [x] Maintenance therapy to prevent deterioration in functioning      -Rational for not reducing medication dose at this time:           [x] Significant risk of deterioration in functioning       [] Concern for elevating risk of harm to self/others      [] Prior dose reduction unsuccessful and/or harmful      [x] Psychiatric/behavioral condition not adequately stabilized/optimized       [x] Medication regimen  recently modified          -OARRS  --I have personally reviewed patient's OARRS report      -Risk/Benefit assessment:  There is no report of signs/symptoms consistent with medication-induced impairment in daily functioning. At this time, benefits of medication felt to outweigh potential risks. But we will continue to reassess need for psychotropic medication at regular 3 to 6 month intervals, or sooner as clinically indicated.      #Medication Monitoring Plan  -Labs due February 2025  --Labs to monitor: CBC, CMP, TSH/T4, lipid panel, Hgb A1c, EKG      #Psychotherapy with E/M services provided as documented above      #MDM/Complexity Issues    [x] Chronic medical comorbidities     [] Chronic risk of harm to self/others     [x] Intellectual/Developmental disability     [x] Need for independent historian due to intellectual/developmental disability            and/or  impaired communication abilities     [] Controlled substance medication requiring regular monitoring     [x] Medication requiring significant ongoing monitoring for toxicity (Abilify)      #Counseling Provided     [x] Diagnostic impression/prognosis      [x] Risks and benefits of treatment options     [x] Instruction for management/treatment and follow-up     [x] Educated patient/caregiver about: safety planning                  #Coordination of care provided    [] Family    [x] Caregiver/DSP/Staff       []Agency supervisor       [] Nursing staff      [] SSA/          []Therapist                     []Guardian         #Follow-up      -in about 2 months, or sooner if new/worsening symptoms         >> Scheduled virtual Follow-up Appt on 3/27/2025 at 14:00        Time  -Prep time on date of the patient encounter: 5 minutes  -Time spent directly with patient/family/caregiver: 40 minutes  -Additional time spent on patient care activities: 15 minutes  -Documentation time: 10 minutes  -Total time on date of patient encounter: 70 minutes       Scribe  Attestation  By signing my name below, I, Shruthi Thompson   attest that this documentation has been prepared under the direction and in the presence of Gloria Dexter DO.

## 2025-01-24 NOTE — PATIENT INSTRUCTIONS
AFTER VISIT SUMMARY      Date: 1/23/2025  Appointment with Psychiatrist - Dr. Dexter      Reason for Visit:  -Routine follow-up/Medication management      Discussed during Appointment:   -Physical health, psychiatric/behavioral symptoms, daily functioning, new issues/concerns     -Treatment plan/management, including medication      Clinical Impression/Status Update:  -No report of significant change in patient's overall psychiatric and behavioral condition since last visit.  -No report of significant new issues/concerns.  -Current medication regimen appears to be appropriately effective without experiencing significant or bothersome side effects.  --We will continue current medications and see her again in 2 months.  --Prior to next visit, please get your labs done.        -Risk/Benefit assessment:   Medical necessity for continued treatment with psychotropic medication:   There is no report of signs/symptoms consistent with medication-induced impairment in daily functioning. At this time, benefits of medication felt to outweigh potential risks. But we will continue to reassess need for psychotropic medication at regular 3 to 6 month intervals, or sooner as clinically indicated.      #Clinic Policies/Procedures  -Refills  Prescriptions will be sent to your pharmacy with enough refills to last until your next appointment. For established patients, we typically provide 6 refills for regular meds and 3 refills for controlled substances (e.g., ADHD stimulant medication, benzodiazepines such as lorazepam). We will not provide additional refills beyond that without having an appointment. You can schedule an appointment by sending a [employer] message or calling our office at [phone].     -Paperwork Requests (e.g., Expert Eval for guardianship)  We ask that you please schedule an appointment if needing paperwork filled out by the doctor (e.g., Expert Eval, FMLA form).  Please provide as much information as possible  about your request and email the doctor any forms needing to be filled out prior to the appointment.       ===========================  INSTRUCTIONS/RECOMMENDATIONS  ===========================    #Medication   -No medication changes made today  --Continue taking your psych medications the same as usual    --Refills will be sent to your pharmacy    >>For prior authorization issues at the pharmacy -> Call the office and ask to talk to Nurse Gastelum.      #To-Do prior to Next Visit  >> You are due for your annual bloodwork.   >> Please get bloodwork/labs done at least one week prior to your next appointment.    -Bloodwork is necessary to help us identify potential medication side effects and monitor your overall health.   -No appointment or paperwork necessary. But you must be fasting for 12 hours prior to getting your blood drawn.   -Find nearest  lab hours and location here: [email or web address]    If having technical Issues with Epic or [employer]-> Please help us improve the Epic experience  To help identify issues needing to be fixed and improve patient care, please report any issues you experience with Epic or  [employer], such as difficulty connecting to video during virtual visits.  [phone]      #Follow-up  --Your next appointment is scheduled for 3/27/2025 at 2:00 pm (virtual visit with Epic)    *If having new/worsening symptoms, please send a [employer] message or call the clinic ([phone]) to discuss being seen sooner   >>If unable to reach the office, send me an email at [email or web address]

## 2025-01-27 PROBLEM — Z91.89 AT RISK FOR SIDE EFFECT OF MEDICATION: Status: ACTIVE | Noted: 2025-01-27

## 2025-01-27 PROBLEM — Z79.899 LONG TERM CURRENT USE OF ANTIPSYCHOTIC MEDICATION: Status: ACTIVE | Noted: 2025-01-27

## 2025-01-27 PROBLEM — Z01.89 ASSESSMENT OF EFFECTS OF PSYCHOTROPIC DRUG: Status: ACTIVE | Noted: 2025-01-27

## 2025-01-27 PROBLEM — Z79.899 ASSESSMENT OF EFFECTS OF PSYCHOTROPIC DRUG: Status: ACTIVE | Noted: 2025-01-27

## 2025-03-27 ENCOUNTER — APPOINTMENT (OUTPATIENT)
Dept: BEHAVIORAL HEALTH | Facility: CLINIC | Age: 60
End: 2025-03-27
Payer: MEDICARE

## 2025-03-27 DIAGNOSIS — F79 INTELLECTUAL DISABILITY: ICD-10-CM

## 2025-03-27 DIAGNOSIS — F41.1 GENERALIZED ANXIETY DISORDER: ICD-10-CM

## 2025-03-27 DIAGNOSIS — Z79.899 LONG TERM CURRENT USE OF ANTIPSYCHOTIC MEDICATION: ICD-10-CM

## 2025-03-27 DIAGNOSIS — F60.9 PERSONALITY DISORDER, UNSPECIFIED: ICD-10-CM

## 2025-03-27 DIAGNOSIS — Z86.59 HISTORY OF DEPRESSION: ICD-10-CM

## 2025-03-27 DIAGNOSIS — Z86.59 PSYCHIATRIC FOLLOW-UP: ICD-10-CM

## 2025-03-27 DIAGNOSIS — Z09 PSYCHIATRIC FOLLOW-UP: ICD-10-CM

## 2025-03-27 DIAGNOSIS — F39 MOOD DISORDER (CMS-HCC): ICD-10-CM

## 2025-03-27 PROCEDURE — G2211 COMPLEX E/M VISIT ADD ON: HCPCS | Performed by: STUDENT IN AN ORGANIZED HEALTH CARE EDUCATION/TRAINING PROGRAM

## 2025-03-27 PROCEDURE — 99214 OFFICE O/P EST MOD 30 MIN: CPT | Performed by: STUDENT IN AN ORGANIZED HEALTH CARE EDUCATION/TRAINING PROGRAM

## 2025-03-27 NOTE — PROGRESS NOTES
"Others Attending Appointment:  -Piedad (Staff)  *Presence necessary as independent historian due to intellectual/developmental disability and/or impaired communication abilities      #LAST INTERVENTION   Last seen about 2 months ago in January 2025. No report of significant change in patient's overall psychiatric and behavioral condition. Order for labs were placed. No medication changes.      HISTORY OF PRESENT ILLNESS   #Interval Change  -No report of significant change in patient's overall psychiatric and behavioral condition since last visit.  -Saw PCP for diabetes. Otherwise, no report of significant new issues/concerns.  -Recently returned from a ship cruise in [place].    #Mood/Irritability   Stable.  No report of significant changes in mood, crying spells, frequent mood swings, or significant irritability.   No report of concern for depression from staff.  Recall from prior: Patient reportedly likes to invite herself to people's homes and gets upset when they are not available.  Patient reportedly not open about her feelings. She notes that she talks to few people but that she'll like to talk to more people.  Recall from prior: Patient reports that she is working on being more assertive and is able to tell people when she's upset.  Recall from prior: Patient reports that she had \"bad days and good days\" in equal amounts. Patient notes that she has recent stressors such as her relationship. Staff reports that what patient describes is how she's feeling usually in response to what happens at work.  Does not endorse cardinal signs/symptoms of major depressive disorder.   Recall from prior: Feelings of sadness and anger since her boyfriend started ignoring her. However, staff reports that patient has been keeping busy with other activities like candle making.       #Behavior   Stable.  No report of physical aggression, significant property destruction, elopement, or self-injurious behavior.   Recall from prior: " "Report of issues with anger or impulse control. The staff reports that she gets angry when they try to restrict her shopping. There is a report of obsession over people and manipulation.      #Sleep  Stable. No report of sleep difficulty.  Recall from prior: Reports difficulty falling asleep about once a month.      #Anxiety   Stable.  No report of excessive worrying, perseverating, or reassurance-seeking behavior.   No report of significant restlessness, pacing, or other signs suggesting psychomotor agitation.   No report of signs/symptoms consistent with separation anxiety or panic attacks.  Recall from prior: Report of ongoing anxiety in setting of her romantic relationships.  Recall from prior: Patient notes that she is a little bit stressed because of her boyfriend. Patient notes that she is \"mad\" most of the time and this feeling is towards her boyfriend.      #Medication   Patient gets her ozempic injection monthly.  -Endorses medication adherence.  -No report of concerns for medication side effects.   -No report of new or significant/bothersome medication side effects.   Recall from prior: Patient is on Ozempic (October 2024) with no reported side effects  Recall from prior: Patient notes that she doesn't require medication for her irritability.      #Health  Patient reports that she has lost 21 pounds in the past 6 months.  No report of hospitalizations or ED visits since last visit.  No report of significant change in health status, overall functioning, or non-psych medication  No report of significant change in appetite.   Patient maintains regular follow up appointments with other multiple providers for her complex medical co-morbidities. No report of problem-based visits outside of regularly scheduled maintenance.  Recall from prior: Patient reports that she lost about 20 pounds on Ozempic and that she believes she has gained back the weight after her ship cruise. Staff reports that patient has not " gained weight.  Recall from prior: patient still making poor food choices. Patient has gone to programs that teach healthy habits. She occasionally exercises by taking walks.  Had allergy testing - allergic to dust and mold. Has scheduled weekly allergy shots.      REVIEW OF SYMPTOMS  -Depression/Mood: negative  -Anxiety: negative  -Psychosis: negative  -Valentina: negative  -ADHD: negative  -ODD: negative  -OCD: negative  -Sexually inappropriate behavior: none reported  -Sleep: No issues reported. No changes from baseline.    -Appetite: No issues reported. No report of pica. No changes from baseline  -Medical ROS: no report of difficulty urinating, seizures, rash, polydipsia, or constipation beyond baseline.  *All other systems have been reviewed and are negative for complaint unless otherwise noted above       PSYCHIATRIC/BEHAVIORAL HISTORY  -Prior diagnoses: depression, anxiety     -Was previously seeing psychiatrist at [facility] (since ~2014)  -Therapist: sees a counselor at [facility], Esther Chi  -Past psych hospitalizations: 2014 and 2023 for SI/HI but per staff report was thought to be misunderstanding  -No reported history of violence  -No reported history of self-injurious behavior  -No reported history of suicide attempt  -Previous psych meds: Escitalopram  -Current psych meds:   --Buspar 15 mg qam, 15mg at noon and 10 mg at 7 pm  --Lamotrigine 150 mg at 7 pm  --Abilify 2 mg once daily at 7 pm        SOCIAL HISTORY  -Lives alone in an apartment; does not like to live with others  -Family: minimal involvement (very few family members left)  -Interests/hobbies: shopping, eating out ([employer]), hanging out with friend Patricia  -Care needs: staff only in the daytime, 4 to 5 hours a day, iADLs, some assistance with household chores reportedly because patient not wanting to rather than being unable to  -Work/School: attends day program/workshop  -Guardianship: guardian Gloria Christiano    -Language/Communication: Good verbal fluency. Spontaneous speech. Good pragmatic language abilities.  -Cognitive abilities: Unknown history of formal testing. Able to read/write about middle school, fair money management abilities.    -Does not endorse ETOH or illicit drug use. No reported history of past substance abuse.  -Former smoker/occasional smoker  -No reported history of significant trauma   -No reported access to firearms            PAST MEDICAL HISTORY  -Hypertension  -Hypercholesterolemia  -Diabetes Mellitus  -Polyps and diverticulosis. Patient undergoes colonoscopy annually.   -PCP: Maxwell Patel  -Podiatrist: Pamela Aranda  -Optometrist: Rony Hidalgo  -Dentist: [facility]  -ObGyn: [facility]        ALLERGIES  -Penicillins  -Seasonal allergies  -Dust  -Mold        CURRENT MEDICATIONS  -Info caregiver and pharmacy  -Current psych meds:   --Buspar 15 mg qam and 10 mg at 7 pm  --Lamotrigine 150 mg at 7 pm  --Abilify 2.5 mg once daily at 7 pm        PHARMACY  -[facility]        FAMILY HISTORY  -No reported family history of suicide  -No reported family history of intellectual disability or autism spectrum disorder  -No reported family history of early sudden cardiac arrest        #Mental Status Exam:     Appearance: adequate grooming   Demeanor: average   Eye Contact: average   Motor Activity: Average, no PMA/PMR.  Musculoskeletal: No TD, tics, or tremor appreciated. AIMS score 0.   Mood: euthymic   Affect: full  Speech: Regular rate, rhythm, volume and tone. Good verbal fluency. Spontaneous speech. Good pragmatic language abilities.  Thought Process: [place]. Generally goal directed. Associations are logical.  Thought Content: Does not endorse suicidal or homicidal ideation, no delusions elicited.   Thought Perception: Does not endorse auditory or visual hallucinations. Does not appear to be responding to hallucinatory stimuli  Orientation: alert, oriented x  3  Attention/Concentration: average  Cognition: mild intellectual disability vs borderline intellectual functioning  Insight: limited  Judgement: adequate      #Psychotherapy   -Provided 25 minutes of psychotherapy to patient and/or family/caregivers    -Psychotherapeutic interventions utilized: supportive, coping skills, DBT, solutions-focused,   -Target issues/Main topics discussed: psychiatric symptoms, behavior, daily life, stressors, living situation, family/friends, hobbies/interests, interpersonal conflicts, dating and related, social norms, safe sex, vacation/travel    -Response to therapy: actively participated and responded favorably to the above psychotherapeutic interventions         # Risk Assessment:  Patient felt to be at low acute and imminent risk of harm to self and others based on consideration of their risk and protective factors, as well as evaluation of their current clinical condition. Patient does not currently meet criteria for inpatient psychiatric hospitalization given that they do not exhibit evidence of clinically significant deterioration in baseline psychiatric illness, aggression, self-injury, or ability to care for themselves. There is no evidence that patient's current caregivers/living environment are unable to safely manage patient's behavior. Outpatient management is currently felt to be appropriate and in the best interest of the patient. Overall risk mitigated by psychiatric follow-up care, use of psychotropic medication, guardianship, and [place] services. Have engaged patient/caregivers in safety planning. They are aware of emergency psychiatric resources available in the event of an acute psychiatric emergency, such as Mobile Crisis, 911, and local ER.          ______________________________  IMPRESSION  Female with mild intellectual disability and history of  anxiety disorder presenting for ongoing management of psychotropic medication.       ###  As of this  appointment:  -Patient's overall psychiatric and behavioral condition does not appear to have significantly changed since last visit.  -No report of significant new issues/concerns.  -Reviewed lab results from annual routine lab monitoring , no major concerns or indication for treatment changes at this time.  -Doing well with diabetes mgt.  -Looking forward to trip in early August to [place] to see family.  -Appears to be tolerating medication regimen without report of significant new or bothersome side effects.   -Will continue current psychotropic medication regimen  -Will plan for follow-up in about 3 to 4 months  ###        Diagnoses:  -Generalized anxiety disorder  -Major depressive disorder  -Mild intellectual disability  -Personality disorder, other specified        PLAN / MANAGEMENT / RECOMMENDATIONS                          #Visit Complexity  -Visit of high complexity given patient's intellectual/developmental disability and/or impaired communication abilities resulting in need for the presence of a third party (staff) to provide clinical information and history.    #Medication management   -Continue:  --Abilify 2 mg daily at 19:00  --Buspar 15 mg 7:00 and 10 mg at 19:00  --Lamotrigine 150 mg once daily at 19:00        #Medication Considerations  -Medication consent/assent:   Risks, benefits, alternatives, off-label uses, black box warnings, and frequent/important side effects of medications have been discussed with patient/caregiver. To the extent possible, they have voiced understanding and agreement with recommended use of psychotropic medication.     -Medical necessity for continued treatment with psychotropic medication:      [] Symptom reduction        [] Improvement in functioning      [] Maintenance therapy to reduce risk of harm to self/others      [x] Maintenance therapy to prevent deterioration in functioning      -Rational for not reducing medication dose at this time:           [x] Significant  risk of deterioration in functioning       [] Concern for elevating risk of harm to self/others      [] Prior dose reduction unsuccessful and/or harmful      [x] Psychiatric/behavioral condition not adequately stabilized/optimized       [x] Medication regimen recently modified          -OARRS  --I have personally reviewed patient's OARRS report      -Risk/Benefit assessment:  There is no report of signs/symptoms consistent with medication-induced impairment in daily functioning. At this time, benefits of medication felt to outweigh potential risks. But we will continue to reassess need for psychotropic medication at regular 3 to 6 month intervals, or sooner as clinically indicated.      #Medication Monitoring Plan  -Labs due February 2025  --Labs to monitor: CBC, CMP, TSH/T4, lipid panel, Hgb A1c, EKG      #Psychotherapy with E/M services provided as documented above      #MDM/Complexity Issues    [x] Chronic medical comorbidities     [] Chronic risk of harm to self/others     [x] Intellectual/Developmental disability     [x] Need for independent historian due to intellectual/developmental disability            and/or  impaired communication abilities     [] Controlled substance medication requiring regular monitoring     [x] Medication requiring significant ongoing monitoring for toxicity (Abilify)      #Counseling Provided     [x] Diagnostic impression/prognosis      [x] Risks and benefits of treatment options     [x] Instruction for management/treatment and follow-up     [x] Educated patient/caregiver about: safety planning                  #Coordination of care provided    [] Family    [x] Caregiver/DSP/Staff       []Agency supervisor       [] Nursing staff      [] SSA/          []Therapist                     []Guardian         #Follow-up      -in about 4 months, or sooner if new/worsening symptoms         >> Scheduled virtual Follow-up Appt on 7/10/2025 at 15:00        Time  -Prep time on date of  the patient encounter: 5 minutes  -Time spent directly with patient/family/caregiver: 40 minutes  -Additional time spent on patient care activities: 15 minutes  -Documentation time: 10 minutes  -Total time on date of patient encounter: 70 minutes       Scribe Attestation  By signing my name below, IBarbi, Scribe   attest that this documentation has been prepared under the direction and in the presence of Gloria Dexter DO.

## 2025-03-27 NOTE — PATIENT INSTRUCTIONS
AFTER VISIT SUMMARY      Date: 3/27/2025  Appointment with Psychiatrist - Dr. Dexter      Reason for Visit:  -Routine follow-up/Medication management      Discussed during Appointment:   -Physical health, psychiatric/behavioral symptoms, daily functioning, new issues/concerns     -Treatment plan/management, including medication      Clinical Impression/Status Update:  -No report of significant change in patient's overall psychiatric and behavioral condition since last visit.  -No report of significant new issues/concerns.  -Current medication regimen appears to be appropriately effective without experiencing significant or bothersome side effects.  --We will continue current medications and see her again in 4 months.      -Risk/Benefit assessment:   Medical necessity for continued treatment with psychotropic medication:   There is no report of signs/symptoms consistent with medication-induced impairment in daily functioning. At this time, benefits of medication felt to outweigh potential risks. But we will continue to reassess need for psychotropic medication at regular 3 to 6 month intervals, or sooner as clinically indicated.      #Clinic Policies/Procedures  -Refills  Prescriptions will be sent to your pharmacy with enough refills to last until your next appointment. For established patients, we typically provide 6 refills for regular meds and 3 refills for controlled substances (e.g., ADHD stimulant medication, benzodiazepines such as lorazepam). We will not provide additional refills beyond that without having an appointment. You can schedule an appointment by sending a [employer] message or calling our office at [phone].     -Paperwork Requests (e.g., Expert Eval for guardianship)  We ask that you please schedule an appointment if needing paperwork filled out by the doctor (e.g., Expert Eval, FMLA form).  Please provide as much information as possible about your request and email the doctor any forms  needing to be filled out prior to the appointment.       ===========================  INSTRUCTIONS/RECOMMENDATIONS  ===========================    #Medication   -No medication changes made today  --Continue taking your psych medications the same as usual    --Refills will be sent to your pharmacy    >>For prior authorization issues at the pharmacy -> Call the office and ask to talk to Nurse Gastelum.      If having technical Issues with Epic or [employer]-> Please help us improve the Epic experience  To help identify issues needing to be fixed and improve patient care, please report any issues you experience with Epic or [employer], such as difficulty connecting to video during virtual visits.  [phone]      #Follow-up  --Your next appointment is scheduled for 7/10/2025 at 3:00 pm (virtual visit with Tinteo)    *If having new/worsening symptoms, please send a [employer] message or call the clinic ([phone]) to discuss being seen sooner   >>If unable to reach the office, send me an email at [email or web address]

## 2025-07-10 ENCOUNTER — APPOINTMENT (OUTPATIENT)
Dept: BEHAVIORAL HEALTH | Facility: CLINIC | Age: 60
End: 2025-07-10
Payer: MEDICARE

## 2025-07-14 ENCOUNTER — TELEPHONE (OUTPATIENT)
Dept: BEHAVIORAL HEALTH | Facility: CLINIC | Age: 60
End: 2025-07-14
Payer: MEDICARE

## 2025-07-14 DIAGNOSIS — Z09 PSYCHIATRIC FOLLOW-UP: ICD-10-CM

## 2025-07-14 DIAGNOSIS — F39 MOOD DISORDER: ICD-10-CM

## 2025-07-14 DIAGNOSIS — Z86.59 PSYCHIATRIC FOLLOW-UP: ICD-10-CM

## 2025-07-14 DIAGNOSIS — F41.1 GENERALIZED ANXIETY DISORDER: ICD-10-CM

## 2025-07-14 DIAGNOSIS — F79 INTELLECTUAL DISABILITY: ICD-10-CM

## 2025-07-14 RX ORDER — LAMOTRIGINE 150 MG/1
TABLET ORAL
Qty: 30 TABLET | Refills: 6 | Status: SHIPPED | OUTPATIENT
Start: 2025-07-14

## 2025-07-14 RX ORDER — BUSPIRONE HYDROCHLORIDE 10 MG/1
TABLET ORAL
Qty: 75 TABLET | Refills: 6 | Status: SHIPPED | OUTPATIENT
Start: 2025-07-14

## 2025-07-14 RX ORDER — ARIPIPRAZOLE 2 MG/1
TABLET ORAL
Qty: 30 TABLET | Refills: 6 | Status: SHIPPED | OUTPATIENT
Start: 2025-07-14

## 2025-09-04 ENCOUNTER — APPOINTMENT (OUTPATIENT)
Dept: BEHAVIORAL HEALTH | Facility: CLINIC | Age: 60
End: 2025-09-04
Payer: MEDICARE